# Patient Record
Sex: FEMALE | Race: WHITE | Employment: OTHER | URBAN - METROPOLITAN AREA
[De-identification: names, ages, dates, MRNs, and addresses within clinical notes are randomized per-mention and may not be internally consistent; named-entity substitution may affect disease eponyms.]

---

## 2017-05-18 ENCOUNTER — HOSPITAL ENCOUNTER (OUTPATIENT)
Dept: GENERAL RADIOLOGY | Facility: HOSPITAL | Age: 67
Discharge: HOME | End: 2017-05-18
Attending: INTERNAL MEDICINE
Payer: COMMERCIAL

## 2017-05-18 DIAGNOSIS — R13.10 DYSPHAGIA: ICD-10-CM

## 2017-05-18 PROCEDURE — 74230 X-RAY XM SWLNG FUNCJ C+: CPT | Performed by: INTERNAL MEDICINE

## 2017-05-18 PROCEDURE — 92611 MOTION FLUOROSCOPY/SWALLOW: CPT

## 2017-05-18 NOTE — PROGRESS NOTES
ADULT VIDEOFLUOROSCOPIC SWALLOWING STUDY    Referring Physician: Dr. Ricardo Houston    1.  Dysphagia R13.10 XR VIDEO SWALLOW (CPT=74230)     XR VIDEO SWALLOW (ILA=00205)      Date of Service: 5/18/2017   Radiologist: Dr. Eve Estrada spontaneously with a second swallow. Esophageal phase: An A-P was used to assess upper esophagus functioning. Adequate flow of bolus through upper esophagus was observed.     Overall Impression: Minimal oropharyngeal dysphagia characterized by reduced their apparent satisfaction. INTERDISCIPLINARY COMMUNICATION  Reviewed results with Radiologist; agreement verbalized. Thank you for your referral. If you have any questions, please contact me at Dept: [phone].     Please co-sign or sign and

## 2017-05-23 ENCOUNTER — OFFICE VISIT (OUTPATIENT)
Dept: SPEECH THERAPY | Facility: HOSPITAL | Age: 67
End: 2017-05-23
Attending: INTERNAL MEDICINE
Payer: COMMERCIAL

## 2017-05-23 PROCEDURE — 92526 ORAL FUNCTION THERAPY: CPT

## 2017-05-23 NOTE — PROGRESS NOTES
Diagnosis:  Dysphagia  Authorized # of Visits:  4 Today's visit:  1/4         Next MD visit: none scheduled  Fall Risk: standard         Precautions: n/a           Medication Changes since last visit?: No  Subjective: \"Nothing has changed. \"  Patient repo completed. See above chart for details. Lingual exercises required deep concentration from the patient to complete. Lingual ROM and strength were within normal limits, however, coordination was moderately impaired as was rate.     Patient is asked to comp

## 2017-05-30 ENCOUNTER — OFFICE VISIT (OUTPATIENT)
Dept: SPEECH THERAPY | Facility: HOSPITAL | Age: 67
End: 2017-05-30
Attending: INTERNAL MEDICINE
Payer: COMMERCIAL

## 2017-05-30 PROCEDURE — 92526 ORAL FUNCTION THERAPY: CPT

## 2017-05-30 NOTE — PROGRESS NOTES
Diagnosis:  Dysphagia  Authorized # of Visits:  4 Today's visit:  2/4         Next MD visit: none scheduled  Fall Risk: standard         Precautions: n/a           Medication Changes since last visit?: Yes. Maxx Otero added for dry cough due to blood pressu Her physician suggested this. Explained how the chin tuck opens the vallecular space to provide a \"holding\" space for material if swallow is delayed. Goal progressing.    Goal #3 Patient will reduce risk of aspiration by completing the following dysph

## 2017-06-06 ENCOUNTER — OFFICE VISIT (OUTPATIENT)
Dept: SPEECH THERAPY | Facility: HOSPITAL | Age: 67
End: 2017-06-06
Attending: INTERNAL MEDICINE
Payer: COMMERCIAL

## 2017-06-06 PROCEDURE — 92526 ORAL FUNCTION THERAPY: CPT

## 2017-06-06 NOTE — PROGRESS NOTES
Diagnosis:  Dysphagia  Authorized # of Visits:  4 Today's visit:  3/4         Next MD visit: none scheduled  Fall Risk: standard         Precautions: n/a           Medication Changes since last visit?: Yes. Ana Vela added for dry cough due to blood pressu progressing. Goal #2 The patient/family/caregiver will demonstrate understanding and implementation of aspiration precautions and swallow strategies independently over 2 session(s). Reviewed precautions.     Patient follows all precautions of small sip

## 2017-06-13 ENCOUNTER — OFFICE VISIT (OUTPATIENT)
Dept: SPEECH THERAPY | Facility: HOSPITAL | Age: 67
End: 2017-06-13
Attending: INTERNAL MEDICINE
Payer: COMMERCIAL

## 2017-06-13 PROCEDURE — 92526 ORAL FUNCTION THERAPY: CPT

## 2017-06-13 NOTE — PROGRESS NOTES
Discharge Summary/Final Progress Note    Diagnosis:  Dysphagia  Authorized # of Visits:  4 Today's visit:  4/4         Next MD visit: none scheduled  Fall Risk: standard         Precautions: n/a           Medication Changes since last visit?: no  Jenny Taylor cohesive bolus. No food or liquid presented today as patient still feels slightly sick from food poisoning two days ago. She reports no coughing during PO intake at home. Goal met.    Goal #2 The patient/family/caregiver will demonstrate understanding an

## 2017-06-26 ENCOUNTER — HOSPITAL ENCOUNTER (OUTPATIENT)
Dept: GENERAL RADIOLOGY | Facility: HOSPITAL | Age: 67
Discharge: HOME | End: 2017-06-26
Attending: INTERNAL MEDICINE
Payer: COMMERCIAL

## 2017-06-26 DIAGNOSIS — R05.9 COUGH: ICD-10-CM

## 2017-06-26 PROCEDURE — 71020 XR CHEST PA + LAT CHEST (CPT=71020): CPT | Performed by: INTERNAL MEDICINE

## 2017-09-28 ENCOUNTER — LAB ENCOUNTER (OUTPATIENT)
Dept: LAB | Facility: HOSPITAL | Age: 67
End: 2017-09-28
Attending: INTERNAL MEDICINE
Payer: COMMERCIAL

## 2017-09-28 ENCOUNTER — HOSPITAL ENCOUNTER (OUTPATIENT)
Dept: GENERAL RADIOLOGY | Facility: HOSPITAL | Age: 67
Discharge: HOME | End: 2017-09-28
Attending: INTERNAL MEDICINE
Payer: COMMERCIAL

## 2017-09-28 DIAGNOSIS — E78.00 PURE HYPERCHOLESTEROLEMIA: Primary | ICD-10-CM

## 2017-09-28 DIAGNOSIS — Z01.818 PRE-OP TESTING: ICD-10-CM

## 2017-09-28 PROCEDURE — 71020 XR CHEST PA + LAT CHEST (CPT=71020): CPT | Performed by: INTERNAL MEDICINE

## 2017-09-28 PROCEDURE — 93005 ELECTROCARDIOGRAM TRACING: CPT

## 2017-09-28 PROCEDURE — 93010 ELECTROCARDIOGRAM REPORT: CPT | Performed by: INTERNAL MEDICINE

## 2017-10-04 ENCOUNTER — HOSPITAL ENCOUNTER (OUTPATIENT)
Dept: NUCLEAR MEDICINE | Facility: HOSPITAL | Age: 67
Discharge: HOME | End: 2017-10-04
Attending: INTERNAL MEDICINE
Payer: COMMERCIAL

## 2017-10-04 ENCOUNTER — LAB ENCOUNTER (OUTPATIENT)
Dept: LAB | Facility: HOSPITAL | Age: 67
End: 2017-10-04
Attending: INTERNAL MEDICINE
Payer: COMMERCIAL

## 2017-10-04 ENCOUNTER — HOSPITAL ENCOUNTER (OUTPATIENT)
Dept: CV DIAGNOSTICS | Facility: HOSPITAL | Age: 67
Discharge: HOME | End: 2017-10-04
Attending: INTERNAL MEDICINE
Payer: COMMERCIAL

## 2017-10-04 DIAGNOSIS — R94.31 ABNORMAL EKG: ICD-10-CM

## 2017-10-04 DIAGNOSIS — Z01.818 PRE-OP TESTING: ICD-10-CM

## 2017-10-04 DIAGNOSIS — E78.00 PURE HYPERCHOLESTEROLEMIA: ICD-10-CM

## 2017-10-04 PROCEDURE — 36415 COLL VENOUS BLD VENIPUNCTURE: CPT

## 2017-10-04 PROCEDURE — 87641 MR-STAPH DNA AMP PROBE: CPT

## 2017-10-04 PROCEDURE — 93018 CV STRESS TEST I&R ONLY: CPT | Performed by: INTERNAL MEDICINE

## 2017-10-04 PROCEDURE — 78452 HT MUSCLE IMAGE SPECT MULT: CPT | Performed by: INTERNAL MEDICINE

## 2017-10-04 PROCEDURE — 86900 BLOOD TYPING SEROLOGIC ABO: CPT

## 2017-10-04 PROCEDURE — 93016 CV STRESS TEST SUPVJ ONLY: CPT | Performed by: INTERNAL MEDICINE

## 2017-10-04 PROCEDURE — 85025 COMPLETE CBC W/AUTO DIFF WBC: CPT

## 2017-10-04 PROCEDURE — 93017 CV STRESS TEST TRACING ONLY: CPT | Performed by: INTERNAL MEDICINE

## 2017-10-04 PROCEDURE — 85730 THROMBOPLASTIN TIME PARTIAL: CPT

## 2017-10-04 PROCEDURE — 85610 PROTHROMBIN TIME: CPT

## 2017-10-04 PROCEDURE — 86850 RBC ANTIBODY SCREEN: CPT

## 2017-10-04 PROCEDURE — 84443 ASSAY THYROID STIM HORMONE: CPT

## 2017-10-04 PROCEDURE — 86901 BLOOD TYPING SEROLOGIC RH(D): CPT

## 2017-10-04 PROCEDURE — 80053 COMPREHEN METABOLIC PANEL: CPT

## 2017-10-04 PROCEDURE — 87086 URINE CULTURE/COLONY COUNT: CPT

## 2017-10-04 RX ORDER — AMLODIPINE BESYLATE 5 MG/1
5 TABLET ORAL DAILY
COMMUNITY

## 2017-10-04 RX ORDER — BENZONATATE 100 MG/1
100 CAPSULE ORAL 3 TIMES DAILY PRN
COMMUNITY

## 2017-10-04 RX ORDER — 0.9 % SODIUM CHLORIDE 0.9 %
VIAL (ML) INJECTION
Status: COMPLETED
Start: 2017-10-04 | End: 2017-10-04

## 2017-10-04 RX ADMIN — 0.9 % SODIUM CHLORIDE 10 ML: 0.9 % VIAL (ML) INJECTION at 09:16:00

## 2017-10-16 RX ORDER — SODIUM CHLORIDE 9 MG/ML
INJECTION, SOLUTION INTRAVENOUS ONCE
Status: COMPLETED | OUTPATIENT
Start: 2017-10-17 | End: 2017-10-17

## 2017-10-17 ENCOUNTER — HOSPITAL ENCOUNTER (OUTPATIENT)
Dept: INTERVENTIONAL RADIOLOGY/VASCULAR | Facility: HOSPITAL | Age: 67
Discharge: HOME | End: 2017-10-17
Attending: INTERNAL MEDICINE | Admitting: INTERNAL MEDICINE
Payer: COMMERCIAL

## 2017-10-17 VITALS
BODY MASS INDEX: 35.36 KG/M2 | HEIGHT: 66 IN | RESPIRATION RATE: 16 BRPM | OXYGEN SATURATION: 96 % | WEIGHT: 220 LBS | DIASTOLIC BLOOD PRESSURE: 79 MMHG | HEART RATE: 66 BPM | SYSTOLIC BLOOD PRESSURE: 127 MMHG

## 2017-10-17 DIAGNOSIS — R94.39 ABNORMAL STRESS TEST: ICD-10-CM

## 2017-10-17 PROCEDURE — B2111ZZ FLUOROSCOPY OF MULTIPLE CORONARY ARTERIES USING LOW OSMOLAR CONTRAST: ICD-10-PCS | Performed by: INTERNAL MEDICINE

## 2017-10-17 PROCEDURE — B2151ZZ FLUOROSCOPY OF LEFT HEART USING LOW OSMOLAR CONTRAST: ICD-10-PCS | Performed by: INTERNAL MEDICINE

## 2017-10-17 PROCEDURE — 99153 MOD SED SAME PHYS/QHP EA: CPT

## 2017-10-17 PROCEDURE — 99152 MOD SED SAME PHYS/QHP 5/>YRS: CPT

## 2017-10-17 PROCEDURE — 4A023N7 MEASUREMENT OF CARDIAC SAMPLING AND PRESSURE, LEFT HEART, PERCUTANEOUS APPROACH: ICD-10-PCS | Performed by: INTERNAL MEDICINE

## 2017-10-17 PROCEDURE — 93458 L HRT ARTERY/VENTRICLE ANGIO: CPT

## 2017-10-17 RX ORDER — SODIUM CHLORIDE 9 MG/ML
INJECTION, SOLUTION INTRAVENOUS
Status: COMPLETED
Start: 2017-10-17 | End: 2017-10-17

## 2017-10-17 RX ORDER — ATORVASTATIN CALCIUM 40 MG/1
40 TABLET, FILM COATED ORAL NIGHTLY
Status: DISCONTINUED | OUTPATIENT
Start: 2017-10-17 | End: 2017-10-17

## 2017-10-17 RX ORDER — TRAMADOL HYDROCHLORIDE 50 MG/1
50 TABLET ORAL EVERY 6 HOURS PRN
Status: DISCONTINUED | OUTPATIENT
Start: 2017-10-17 | End: 2017-10-17

## 2017-10-17 RX ORDER — LIDOCAINE HYDROCHLORIDE 20 MG/ML
INJECTION, SOLUTION EPIDURAL; INFILTRATION; INTRACAUDAL; PERINEURAL
Status: COMPLETED
Start: 2017-10-17 | End: 2017-10-17

## 2017-10-17 RX ORDER — NITROGLYCERIN 20 MG/100ML
INJECTION INTRAVENOUS
Status: COMPLETED
Start: 2017-10-17 | End: 2017-10-17

## 2017-10-17 RX ORDER — ATORVASTATIN CALCIUM 40 MG/1
40 TABLET, FILM COATED ORAL NIGHTLY
Qty: 30 TABLET | Refills: 0 | Status: SHIPPED | OUTPATIENT
Start: 2017-10-17

## 2017-10-17 RX ORDER — MIDAZOLAM HYDROCHLORIDE 1 MG/ML
INJECTION INTRAMUSCULAR; INTRAVENOUS
Status: COMPLETED
Start: 2017-10-17 | End: 2017-10-17

## 2017-10-17 RX ORDER — VERAPAMIL HYDROCHLORIDE 2.5 MG/ML
INJECTION, SOLUTION INTRAVENOUS
Status: COMPLETED
Start: 2017-10-17 | End: 2017-10-17

## 2017-10-17 RX ORDER — TRAMADOL HYDROCHLORIDE 50 MG/1
100 TABLET ORAL EVERY 6 HOURS PRN
Status: DISCONTINUED | OUTPATIENT
Start: 2017-10-17 | End: 2017-10-17

## 2017-10-17 RX ORDER — HEPARIN SODIUM 1000 [USP'U]/ML
INJECTION, SOLUTION INTRAVENOUS; SUBCUTANEOUS
Status: COMPLETED
Start: 2017-10-17 | End: 2017-10-17

## 2017-10-17 RX ORDER — SODIUM CHLORIDE 9 MG/ML
INJECTION, SOLUTION INTRAVENOUS CONTINUOUS
Status: DISCONTINUED | OUTPATIENT
Start: 2017-10-17 | End: 2017-10-17

## 2017-10-17 RX ORDER — ACETAMINOPHEN 325 MG/1
650 TABLET ORAL EVERY 4 HOURS PRN
Status: DISCONTINUED | OUTPATIENT
Start: 2017-10-17 | End: 2017-10-17

## 2017-10-17 RX ADMIN — SODIUM CHLORIDE: 9 INJECTION, SOLUTION INTRAVENOUS at 09:01:00

## 2017-10-17 RX ADMIN — SODIUM CHLORIDE: 9 INJECTION, SOLUTION INTRAVENOUS at 07:13:00

## 2017-10-17 NOTE — OPERATIVE REPORT
Preop diagnosis: abnormal stress test preop  Post op diagnosis: same  Procedures: [facility] lv cors  Findings: After informed consent was obtained patient was placed in the cardiac catheterization lab and right wrist were sterilely draped and prepped after a Bar aspirin therapy. Patient may proceed with surgery as planned.   EBL: 20 mls  Specimens: None

## 2017-11-24 ENCOUNTER — LAB ENCOUNTER (OUTPATIENT)
Dept: LAB | Facility: HOSPITAL | Age: 67
End: 2017-11-24
Attending: ORTHOPAEDIC SURGERY
Payer: COMMERCIAL

## 2017-11-24 DIAGNOSIS — Z01.818 PREOP TESTING: ICD-10-CM

## 2017-11-24 PROCEDURE — 87641 MR-STAPH DNA AMP PROBE: CPT

## 2017-11-24 PROCEDURE — 36415 COLL VENOUS BLD VENIPUNCTURE: CPT

## 2017-11-24 PROCEDURE — 86850 RBC ANTIBODY SCREEN: CPT

## 2017-11-24 PROCEDURE — 86901 BLOOD TYPING SEROLOGIC RH(D): CPT

## 2017-11-24 PROCEDURE — 86900 BLOOD TYPING SEROLOGIC ABO: CPT

## 2017-11-29 ENCOUNTER — ANESTHESIA (OUTPATIENT)
Dept: SURGERY | Facility: HOSPITAL | Age: 67
DRG: 470 | End: 2017-11-29
Payer: COMMERCIAL

## 2017-11-29 ENCOUNTER — SURGERY (OUTPATIENT)
Age: 67
End: 2017-11-29

## 2017-11-29 ENCOUNTER — APPOINTMENT (OUTPATIENT)
Dept: GENERAL RADIOLOGY | Facility: HOSPITAL | Age: 67
DRG: 470 | End: 2017-11-29
Attending: NURSE PRACTITIONER
Payer: COMMERCIAL

## 2017-11-29 ENCOUNTER — ANESTHESIA EVENT (OUTPATIENT)
Dept: SURGERY | Facility: HOSPITAL | Age: 67
DRG: 470 | End: 2017-11-29
Payer: COMMERCIAL

## 2017-11-29 ENCOUNTER — HOSPITAL ENCOUNTER (INPATIENT)
Facility: HOSPITAL | Age: 67
LOS: 4 days | Discharge: HOME HEALTH | DRG: 470 | End: 2017-12-03
Attending: ORTHOPAEDIC SURGERY | Admitting: ORTHOPAEDIC SURGERY
Payer: COMMERCIAL

## 2017-11-29 DIAGNOSIS — Z01.818 PREOP TESTING: Primary | ICD-10-CM

## 2017-11-29 DIAGNOSIS — M17.12 PRIMARY OSTEOARTHRITIS OF LEFT KNEE: ICD-10-CM

## 2017-11-29 PROCEDURE — 97161 PT EVAL LOW COMPLEX 20 MIN: CPT

## 2017-11-29 PROCEDURE — 0SRD0J9 REPLACEMENT OF LEFT KNEE JOINT WITH SYNTHETIC SUBSTITUTE, CEMENTED, OPEN APPROACH: ICD-10-PCS | Performed by: ORTHOPAEDIC SURGERY

## 2017-11-29 PROCEDURE — 88311 DECALCIFY TISSUE: CPT | Performed by: ORTHOPAEDIC SURGERY

## 2017-11-29 PROCEDURE — 97116 GAIT TRAINING THERAPY: CPT

## 2017-11-29 PROCEDURE — 73560 X-RAY EXAM OF KNEE 1 OR 2: CPT | Performed by: NURSE PRACTITIONER

## 2017-11-29 PROCEDURE — 88305 TISSUE EXAM BY PATHOLOGIST: CPT | Performed by: ORTHOPAEDIC SURGERY

## 2017-11-29 DEVICE — ART SURF CRFLEX CH/3 4YEL 10MM: Type: IMPLANTABLE DEVICE | Site: KNEE | Status: FUNCTIONAL

## 2017-11-29 DEVICE — NEX PRECT STEM TIB PLT SZ 4: Type: IMPLANTABLE DEVICE | Site: KNEE | Status: FUNCTIONAL

## 2017-11-29 DEVICE — NEX ALL-POLY PAT 32MM: Type: IMPLANTABLE DEVICE | Site: KNEE | Status: FUNCTIONAL

## 2017-11-29 DEVICE — IMPLANTABLE DEVICE: Type: IMPLANTABLE DEVICE | Site: KNEE | Status: FUNCTIONAL

## 2017-11-29 DEVICE — CEMENT BONE ZIM PALICOS R: Type: IMPLANTABLE DEVICE | Site: KNEE | Status: FUNCTIONAL

## 2017-11-29 RX ORDER — HYDROCODONE BITARTRATE AND ACETAMINOPHEN 5; 325 MG/1; MG/1
2 TABLET ORAL AS NEEDED
Status: DISCONTINUED | OUTPATIENT
Start: 2017-11-29 | End: 2017-11-29 | Stop reason: HOSPADM

## 2017-11-29 RX ORDER — OXYCODONE HYDROCHLORIDE AND ACETAMINOPHEN 5; 325 MG/1; MG/1
1 TABLET ORAL EVERY 4 HOURS PRN
Status: DISCONTINUED | OUTPATIENT
Start: 2017-11-29 | End: 2017-11-30

## 2017-11-29 RX ORDER — OXYCODONE AND ACETAMINOPHEN 10; 325 MG/1; MG/1
1 TABLET ORAL EVERY 4 HOURS PRN
Status: DISCONTINUED | OUTPATIENT
Start: 2017-11-29 | End: 2017-12-03

## 2017-11-29 RX ORDER — SODIUM CHLORIDE, SODIUM LACTATE, POTASSIUM CHLORIDE, CALCIUM CHLORIDE 600; 310; 30; 20 MG/100ML; MG/100ML; MG/100ML; MG/100ML
INJECTION, SOLUTION INTRAVENOUS CONTINUOUS
Status: DISCONTINUED | OUTPATIENT
Start: 2017-11-29 | End: 2017-12-02

## 2017-11-29 RX ORDER — METOCLOPRAMIDE 10 MG/1
10 TABLET ORAL ONCE
Status: DISCONTINUED | OUTPATIENT
Start: 2017-11-29 | End: 2017-11-29 | Stop reason: HOSPADM

## 2017-11-29 RX ORDER — ONDANSETRON 2 MG/ML
4 INJECTION INTRAMUSCULAR; INTRAVENOUS ONCE AS NEEDED
Status: DISCONTINUED | OUTPATIENT
Start: 2017-11-29 | End: 2017-11-29 | Stop reason: HOSPADM

## 2017-11-29 RX ORDER — SODIUM CHLORIDE, SODIUM LACTATE, POTASSIUM CHLORIDE, CALCIUM CHLORIDE 600; 310; 30; 20 MG/100ML; MG/100ML; MG/100ML; MG/100ML
INJECTION, SOLUTION INTRAVENOUS CONTINUOUS
Status: DISCONTINUED | OUTPATIENT
Start: 2017-11-29 | End: 2017-11-29 | Stop reason: HOSPADM

## 2017-11-29 RX ORDER — HYDROCODONE BITARTRATE AND ACETAMINOPHEN 5; 325 MG/1; MG/1
1 TABLET ORAL AS NEEDED
Status: DISCONTINUED | OUTPATIENT
Start: 2017-11-29 | End: 2017-11-29 | Stop reason: HOSPADM

## 2017-11-29 RX ORDER — DOXEPIN HYDROCHLORIDE 50 MG/1
1 CAPSULE ORAL DAILY
Status: DISCONTINUED | OUTPATIENT
Start: 2017-11-29 | End: 2017-12-03

## 2017-11-29 RX ORDER — CELECOXIB 200 MG/1
200 CAPSULE ORAL ONCE
Status: COMPLETED | OUTPATIENT
Start: 2017-11-29 | End: 2017-11-29

## 2017-11-29 RX ORDER — NALOXONE HYDROCHLORIDE 0.4 MG/ML
80 INJECTION, SOLUTION INTRAMUSCULAR; INTRAVENOUS; SUBCUTANEOUS AS NEEDED
Status: DISCONTINUED | OUTPATIENT
Start: 2017-11-29 | End: 2017-11-29 | Stop reason: HOSPADM

## 2017-11-29 RX ORDER — SODIUM CHLORIDE 0.9 % (FLUSH) 0.9 %
10 SYRINGE (ML) INJECTION AS NEEDED
Status: DISCONTINUED | OUTPATIENT
Start: 2017-11-29 | End: 2017-12-03

## 2017-11-29 RX ORDER — PANTOPRAZOLE SODIUM 20 MG/1
20 TABLET, DELAYED RELEASE ORAL
Status: DISCONTINUED | OUTPATIENT
Start: 2017-11-30 | End: 2017-12-03

## 2017-11-29 RX ORDER — OXYCODONE HCL 10 MG/1
10 TABLET, FILM COATED, EXTENDED RELEASE ORAL EVERY 12 HOURS
Status: DISCONTINUED | OUTPATIENT
Start: 2017-11-29 | End: 2017-12-03

## 2017-11-29 RX ORDER — ONDANSETRON 2 MG/ML
4 INJECTION INTRAMUSCULAR; INTRAVENOUS EVERY 4 HOURS PRN
Status: DISCONTINUED | OUTPATIENT
Start: 2017-11-29 | End: 2017-12-03

## 2017-11-29 RX ORDER — KETOROLAC TROMETHAMINE 15 MG/ML
15 INJECTION, SOLUTION INTRAMUSCULAR; INTRAVENOUS ONCE
Status: COMPLETED | OUTPATIENT
Start: 2017-11-29 | End: 2017-11-29

## 2017-11-29 RX ORDER — DOCUSATE SODIUM 100 MG/1
100 CAPSULE, LIQUID FILLED ORAL 2 TIMES DAILY
Status: DISCONTINUED | OUTPATIENT
Start: 2017-11-29 | End: 2017-12-03

## 2017-11-29 RX ORDER — SODIUM CHLORIDE 9 MG/ML
INJECTION, SOLUTION INTRAVENOUS CONTINUOUS
Status: DISCONTINUED | OUTPATIENT
Start: 2017-11-29 | End: 2017-12-01

## 2017-11-29 RX ORDER — METOCLOPRAMIDE HYDROCHLORIDE 5 MG/ML
10 INJECTION INTRAMUSCULAR; INTRAVENOUS EVERY 6 HOURS PRN
Status: ACTIVE | OUTPATIENT
Start: 2017-11-29 | End: 2017-12-01

## 2017-11-29 RX ORDER — BENZONATATE 100 MG/1
100 CAPSULE ORAL 3 TIMES DAILY PRN
Status: DISCONTINUED | OUTPATIENT
Start: 2017-11-29 | End: 2017-12-03

## 2017-11-29 RX ORDER — OXYCODONE AND ACETAMINOPHEN 10; 325 MG/1; MG/1
2 TABLET ORAL EVERY 4 HOURS PRN
Status: DISCONTINUED | OUTPATIENT
Start: 2017-11-29 | End: 2017-11-30

## 2017-11-29 RX ORDER — MORPHINE SULFATE 10 MG/ML
6 INJECTION, SOLUTION INTRAMUSCULAR; INTRAVENOUS EVERY 10 MIN PRN
Status: DISCONTINUED | OUTPATIENT
Start: 2017-11-29 | End: 2017-11-29 | Stop reason: HOSPADM

## 2017-11-29 RX ORDER — SCOLOPAMINE TRANSDERMAL SYSTEM 1 MG/1
1 PATCH, EXTENDED RELEASE TRANSDERMAL ONCE
Status: COMPLETED | OUTPATIENT
Start: 2017-11-29 | End: 2017-12-02

## 2017-11-29 RX ORDER — HYDROCODONE BITARTRATE AND ACETAMINOPHEN 7.5; 325 MG/1; MG/1
1 TABLET ORAL EVERY 6 HOURS PRN
Status: DISCONTINUED | OUTPATIENT
Start: 2017-11-29 | End: 2017-12-03

## 2017-11-29 RX ORDER — FAMOTIDINE 20 MG/1
20 TABLET ORAL ONCE
Status: DISCONTINUED | OUTPATIENT
Start: 2017-11-29 | End: 2017-11-29 | Stop reason: HOSPADM

## 2017-11-29 RX ORDER — HYDROMORPHONE HYDROCHLORIDE 1 MG/ML
0.2 INJECTION, SOLUTION INTRAMUSCULAR; INTRAVENOUS; SUBCUTANEOUS EVERY 5 MIN PRN
Status: DISCONTINUED | OUTPATIENT
Start: 2017-11-29 | End: 2017-11-29 | Stop reason: HOSPADM

## 2017-11-29 RX ORDER — HYDROMORPHONE HYDROCHLORIDE 1 MG/ML
0.2 INJECTION, SOLUTION INTRAMUSCULAR; INTRAVENOUS; SUBCUTANEOUS EVERY 2 HOUR PRN
Status: DISCONTINUED | OUTPATIENT
Start: 2017-11-29 | End: 2017-12-03

## 2017-11-29 RX ORDER — SODIUM PHOSPHATE, DIBASIC AND SODIUM PHOSPHATE, MONOBASIC 7; 19 G/133ML; G/133ML
1 ENEMA RECTAL ONCE AS NEEDED
Status: DISCONTINUED | OUTPATIENT
Start: 2017-11-29 | End: 2017-12-03

## 2017-11-29 RX ORDER — MIDAZOLAM HYDROCHLORIDE 1 MG/ML
INJECTION INTRAMUSCULAR; INTRAVENOUS AS NEEDED
Status: DISCONTINUED | OUTPATIENT
Start: 2017-11-29 | End: 2017-11-29 | Stop reason: SURG

## 2017-11-29 RX ORDER — OXYCODONE HCL 10 MG/1
10 TABLET, FILM COATED, EXTENDED RELEASE ORAL ONCE
Status: COMPLETED | OUTPATIENT
Start: 2017-11-29 | End: 2017-11-29

## 2017-11-29 RX ORDER — ACETAMINOPHEN 325 MG/1
650 TABLET ORAL EVERY 6 HOURS PRN
Status: DISCONTINUED | OUTPATIENT
Start: 2017-11-29 | End: 2017-12-03

## 2017-11-29 RX ORDER — POLYETHYLENE GLYCOL 3350 17 G/17G
17 POWDER, FOR SOLUTION ORAL DAILY PRN
Status: DISCONTINUED | OUTPATIENT
Start: 2017-11-29 | End: 2017-12-03

## 2017-11-29 RX ORDER — DIPHENHYDRAMINE HYDROCHLORIDE 50 MG/ML
12.5 INJECTION INTRAMUSCULAR; INTRAVENOUS EVERY 4 HOURS PRN
Status: DISCONTINUED | OUTPATIENT
Start: 2017-11-29 | End: 2017-12-03

## 2017-11-29 RX ORDER — BUPIVACAINE HYDROCHLORIDE 2.5 MG/ML
INJECTION, SOLUTION EPIDURAL; INFILTRATION; INTRACAUDAL AS NEEDED
Status: DISCONTINUED | OUTPATIENT
Start: 2017-11-29 | End: 2017-11-29 | Stop reason: HOSPADM

## 2017-11-29 RX ORDER — CELECOXIB 200 MG/1
200 CAPSULE ORAL EVERY 12 HOURS
Status: DISCONTINUED | OUTPATIENT
Start: 2017-11-30 | End: 2017-12-03

## 2017-11-29 RX ORDER — HYDROCODONE BITARTRATE AND ACETAMINOPHEN 7.5; 325 MG/1; MG/1
2 TABLET ORAL EVERY 6 HOURS PRN
Status: DISCONTINUED | OUTPATIENT
Start: 2017-11-29 | End: 2017-12-03

## 2017-11-29 RX ORDER — MORPHINE SULFATE 4 MG/ML
4 INJECTION, SOLUTION INTRAMUSCULAR; INTRAVENOUS EVERY 10 MIN PRN
Status: DISCONTINUED | OUTPATIENT
Start: 2017-11-29 | End: 2017-11-29 | Stop reason: HOSPADM

## 2017-11-29 RX ORDER — HYDROMORPHONE HYDROCHLORIDE 1 MG/ML
0.6 INJECTION, SOLUTION INTRAMUSCULAR; INTRAVENOUS; SUBCUTANEOUS
Status: ACTIVE | OUTPATIENT
Start: 2017-11-29 | End: 2017-11-30

## 2017-11-29 RX ORDER — HALOPERIDOL 5 MG/ML
0.25 INJECTION INTRAMUSCULAR ONCE AS NEEDED
Status: DISCONTINUED | OUTPATIENT
Start: 2017-11-29 | End: 2017-11-29 | Stop reason: HOSPADM

## 2017-11-29 RX ORDER — DIPHENHYDRAMINE HCL 25 MG
25 CAPSULE ORAL EVERY 4 HOURS PRN
Status: DISPENSED | OUTPATIENT
Start: 2017-11-29 | End: 2017-11-30

## 2017-11-29 RX ORDER — FAMOTIDINE 10 MG/ML
20 INJECTION, SOLUTION INTRAVENOUS 2 TIMES DAILY
Status: DISCONTINUED | OUTPATIENT
Start: 2017-11-29 | End: 2017-12-01

## 2017-11-29 RX ORDER — DIPHENHYDRAMINE HYDROCHLORIDE 50 MG/ML
12.5 INJECTION INTRAMUSCULAR; INTRAVENOUS EVERY 4 HOURS PRN
Status: ACTIVE | OUTPATIENT
Start: 2017-11-29 | End: 2017-11-30

## 2017-11-29 RX ORDER — AMLODIPINE BESYLATE 5 MG/1
5 TABLET ORAL DAILY
Status: DISCONTINUED | OUTPATIENT
Start: 2017-11-30 | End: 2017-12-03

## 2017-11-29 RX ORDER — ACETAMINOPHEN 500 MG
1000 TABLET ORAL ONCE
Status: COMPLETED | OUTPATIENT
Start: 2017-11-29 | End: 2017-11-29

## 2017-11-29 RX ORDER — BUPIVACAINE HYDROCHLORIDE 7.5 MG/ML
INJECTION, SOLUTION INTRASPINAL AS NEEDED
Status: DISCONTINUED | OUTPATIENT
Start: 2017-11-29 | End: 2017-11-29 | Stop reason: SURG

## 2017-11-29 RX ORDER — ONDANSETRON 2 MG/ML
INJECTION INTRAMUSCULAR; INTRAVENOUS AS NEEDED
Status: DISCONTINUED | OUTPATIENT
Start: 2017-11-29 | End: 2017-11-29 | Stop reason: SURG

## 2017-11-29 RX ORDER — HETASTARCH 6 G/100ML
500 INJECTION, SOLUTION INTRAVENOUS ONCE
Status: COMPLETED | OUTPATIENT
Start: 2017-11-29 | End: 2017-11-29

## 2017-11-29 RX ORDER — HYDROMORPHONE HYDROCHLORIDE 1 MG/ML
0.8 INJECTION, SOLUTION INTRAMUSCULAR; INTRAVENOUS; SUBCUTANEOUS EVERY 2 HOUR PRN
Status: DISCONTINUED | OUTPATIENT
Start: 2017-11-29 | End: 2017-12-03

## 2017-11-29 RX ORDER — ONDANSETRON 2 MG/ML
4 INJECTION INTRAMUSCULAR; INTRAVENOUS ONCE AS NEEDED
Status: COMPLETED | OUTPATIENT
Start: 2017-11-29 | End: 2017-11-29

## 2017-11-29 RX ORDER — NALOXONE HYDROCHLORIDE 0.4 MG/ML
0.08 INJECTION, SOLUTION INTRAMUSCULAR; INTRAVENOUS; SUBCUTANEOUS
Status: ACTIVE | OUTPATIENT
Start: 2017-11-29 | End: 2017-11-30

## 2017-11-29 RX ORDER — ATORVASTATIN CALCIUM 40 MG/1
40 TABLET, FILM COATED ORAL NIGHTLY
Status: DISCONTINUED | OUTPATIENT
Start: 2017-11-29 | End: 2017-12-03

## 2017-11-29 RX ORDER — HYDROMORPHONE HYDROCHLORIDE 1 MG/ML
0.4 INJECTION, SOLUTION INTRAMUSCULAR; INTRAVENOUS; SUBCUTANEOUS EVERY 5 MIN PRN
Status: DISCONTINUED | OUTPATIENT
Start: 2017-11-29 | End: 2017-11-29 | Stop reason: HOSPADM

## 2017-11-29 RX ORDER — LIDOCAINE HYDROCHLORIDE 10 MG/ML
INJECTION, SOLUTION EPIDURAL; INFILTRATION; INTRACAUDAL; PERINEURAL AS NEEDED
Status: DISCONTINUED | OUTPATIENT
Start: 2017-11-29 | End: 2017-11-29 | Stop reason: SURG

## 2017-11-29 RX ORDER — HYDROMORPHONE HYDROCHLORIDE 1 MG/ML
0.4 INJECTION, SOLUTION INTRAMUSCULAR; INTRAVENOUS; SUBCUTANEOUS
Status: ACTIVE | OUTPATIENT
Start: 2017-11-29 | End: 2017-11-30

## 2017-11-29 RX ORDER — FAMOTIDINE 10 MG
20 TABLET ORAL 2 TIMES DAILY
Status: DISCONTINUED | OUTPATIENT
Start: 2017-11-29 | End: 2017-12-01

## 2017-11-29 RX ORDER — HYDROMORPHONE HYDROCHLORIDE 1 MG/ML
0.6 INJECTION, SOLUTION INTRAMUSCULAR; INTRAVENOUS; SUBCUTANEOUS EVERY 5 MIN PRN
Status: DISCONTINUED | OUTPATIENT
Start: 2017-11-29 | End: 2017-11-29 | Stop reason: HOSPADM

## 2017-11-29 RX ORDER — NALBUPHINE HCL 10 MG/ML
2.5 AMPUL (ML) INJECTION EVERY 4 HOURS PRN
Status: DISCONTINUED | OUTPATIENT
Start: 2017-11-29 | End: 2017-12-03

## 2017-11-29 RX ORDER — DIPHENHYDRAMINE HCL 25 MG
25 CAPSULE ORAL EVERY 4 HOURS PRN
Status: DISCONTINUED | OUTPATIENT
Start: 2017-11-29 | End: 2017-12-03

## 2017-11-29 RX ORDER — MORPHINE SULFATE 1 MG/ML
INJECTION, SOLUTION EPIDURAL; INTRATHECAL; INTRAVENOUS AS NEEDED
Status: DISCONTINUED | OUTPATIENT
Start: 2017-11-29 | End: 2017-11-29 | Stop reason: SURG

## 2017-11-29 RX ORDER — PAROXETINE HYDROCHLORIDE 20 MG/1
20 TABLET, FILM COATED ORAL NIGHTLY
Status: DISCONTINUED | OUTPATIENT
Start: 2017-11-29 | End: 2017-12-03

## 2017-11-29 RX ORDER — MORPHINE SULFATE 2 MG/ML
2 INJECTION, SOLUTION INTRAMUSCULAR; INTRAVENOUS EVERY 10 MIN PRN
Status: DISCONTINUED | OUTPATIENT
Start: 2017-11-29 | End: 2017-11-29 | Stop reason: HOSPADM

## 2017-11-29 RX ORDER — HYDROMORPHONE HYDROCHLORIDE 1 MG/ML
0.4 INJECTION, SOLUTION INTRAMUSCULAR; INTRAVENOUS; SUBCUTANEOUS EVERY 2 HOUR PRN
Status: DISCONTINUED | OUTPATIENT
Start: 2017-11-29 | End: 2017-12-03

## 2017-11-29 RX ORDER — HALOPERIDOL 5 MG/ML
0.5 INJECTION INTRAMUSCULAR ONCE AS NEEDED
Status: ACTIVE | OUTPATIENT
Start: 2017-11-29 | End: 2017-11-29

## 2017-11-29 RX ORDER — DEXAMETHASONE SODIUM PHOSPHATE 4 MG/ML
VIAL (ML) INJECTION AS NEEDED
Status: DISCONTINUED | OUTPATIENT
Start: 2017-11-29 | End: 2017-11-29 | Stop reason: SURG

## 2017-11-29 RX ORDER — BISACODYL 10 MG
10 SUPPOSITORY, RECTAL RECTAL
Status: DISCONTINUED | OUTPATIENT
Start: 2017-11-29 | End: 2017-12-03

## 2017-11-29 RX ORDER — ZOLPIDEM TARTRATE 5 MG/1
5 TABLET ORAL NIGHTLY PRN
Status: DISCONTINUED | OUTPATIENT
Start: 2017-11-29 | End: 2017-12-03

## 2017-11-29 RX ADMIN — MIDAZOLAM HYDROCHLORIDE 2 MG: 1 INJECTION INTRAMUSCULAR; INTRAVENOUS at 08:03:00

## 2017-11-29 RX ADMIN — DEXAMETHASONE SODIUM PHOSPHATE 4 MG: 4 MG/ML VIAL (ML) INJECTION at 08:26:00

## 2017-11-29 RX ADMIN — SODIUM CHLORIDE, SODIUM LACTATE, POTASSIUM CHLORIDE, CALCIUM CHLORIDE: 600; 310; 30; 20 INJECTION, SOLUTION INTRAVENOUS at 08:02:00

## 2017-11-29 RX ADMIN — LIDOCAINE HYDROCHLORIDE 15 MG: 10 INJECTION, SOLUTION EPIDURAL; INFILTRATION; INTRACAUDAL; PERINEURAL at 08:04:00

## 2017-11-29 RX ADMIN — MORPHINE SULFATE 0.25 MG: 1 INJECTION, SOLUTION EPIDURAL; INTRATHECAL; INTRAVENOUS at 08:10:00

## 2017-11-29 RX ADMIN — SODIUM CHLORIDE, SODIUM LACTATE, POTASSIUM CHLORIDE, CALCIUM CHLORIDE: 600; 310; 30; 20 INJECTION, SOLUTION INTRAVENOUS at 10:15:00

## 2017-11-29 RX ADMIN — ONDANSETRON 4 MG: 2 INJECTION INTRAMUSCULAR; INTRAVENOUS at 10:05:00

## 2017-11-29 RX ADMIN — LIDOCAINE HYDROCHLORIDE 25 MG: 10 INJECTION, SOLUTION EPIDURAL; INFILTRATION; INTRACAUDAL; PERINEURAL at 08:15:00

## 2017-11-29 RX ADMIN — BUPIVACAINE HYDROCHLORIDE 1.4 ML: 7.5 INJECTION, SOLUTION INTRASPINAL at 08:10:00

## 2017-11-29 NOTE — CM/SW NOTE
11/29CM- MD orders received in regards to discharge planning. The Patient was seen at bedside. The Patient resides  in [place] in 2nd floor walk up [facility] with 12 stairs to her unit.  .  Prior to hospitalization, the Patient was  driving,  grocery [place]

## 2017-11-29 NOTE — ANESTHESIA POSTPROCEDURE EVALUATION
Patient: Jacobo Landry    Procedure Summary     Date:  11/29/17 Room / Location:  [address] [place] [address] MAIN OR [address] [place] [address] MAIN OR    Anesthesia Start:  0802 Anesthesia Stop:      Procedure:  KNEE TOTAL REPLACEMENT (Left ) Diagnosis:  (degenerative joint diseae)    Surgeon:

## 2017-11-29 NOTE — PROGRESS NOTES
[facility]    Progress Note    Shaun Rubio Patient Status:  Surgery Admit    3/21/1950 MRN [id number]   Location [address] [facility] Attending Murtaza Santos MD   Hosp Day # 0 PCP Afua Bynum MD       Subjective:   Fabian Nunes

## 2017-11-29 NOTE — H&P
History & Physical Examination    Patient Name: Mp Limon  MRN: [id number]  CSN: [id number]  YOB: 1950    Diagnosis: L knee DJD    Present Illness: Mp Limon is a 75year old yo female with a history of L knee end stage DJD.   Cleared ENDOSCOPY,EXAM  Family History   Problem Relation Age of Onset   • Lung Disorder Father      emphysema   • Cancer Mother      breast        Smoking status: Former Smoker  1.00 Packs/day     Smokeless tobacco: Former User    Comment: quit 40 yrs ago    [place]

## 2017-11-29 NOTE — PROGRESS NOTES
Esomeprazole is a non-formulary medication and will be therapeutically interchanged to Pantoprozole per P&T protocol

## 2017-11-29 NOTE — PROGRESS NOTES
11/29/17 161   Clinical Encounter Type   Visited With Patient and family together  (Daughter at bedside)   Routine Visit Introduction   Referral From Nurse   Referral To    Patient Spiritual Encounters   Spiritual Assessment Completed 1   Spiri

## 2017-11-29 NOTE — ANESTHESIA PROCEDURE NOTES
Spinal Block  Performed by: Sharri Gan  Authorized by: Viktoriya Garza     Patient Location:  OR  Start Time:  11/29/2017 8:04 AM  End Time:  11/29/2017 8:10 AM  Site identification: surface landmarks    Reason for Block: at surgeon's reques

## 2017-11-29 NOTE — ANESTHESIA PREPROCEDURE EVALUATION
Anesthesia PreOp Note    HPI:     Sherrod Harada is a 75year old female who presents for preoperative consultation requested by: Linward Moritz, MD    Date of Surgery: 11/29/2017    Procedure(s):  KNEE TOTAL REPLACEMENT  Indication: degenerative joint dis (REGLAN) tab 10 mg 10 mg Oral Once Yaima Soares MD    scopolamine (TRANSDERM-SCOP) 1.5 mg patch 1 patch Transdermal Once Flaquito Waldrop NP 1 patch at 11/29/17 0657   CeFAZolin Sodium (ANCEF/KEFZOL) premix IV syringe 2 g 2 g Intravenous Once Samy Right arm   Pulse:  76   Resp:  16   Temp:  97.9 °F (36.6 °C)   TempSrc:  Oral   SpO2:  94%   Weight: 97.5 kg (215 lb) 101.6 kg (224 lb)   Height: 1.676 m (5' 6\") 1.676 m (5' 6\")        Anesthesia ROS/Med Hx and Physical Exam     Patient summary reviewed

## 2017-11-29 NOTE — PHYSICAL THERAPY NOTE
PHYSICAL THERAPY KNEE EVALUATION - INPATIENT     Room Number: 447/447-A  Evaluation Date: 11/29/2017  Type of Evaluation: Initial  Physician Order: PT Eval and Treat    Presenting Problem: s/p left total knee arthroplasty  Reason for Therapy: Mobility Dysf • Esophageal reflux    • High blood pressure    • High cholesterol    • Osteoarthritis    • PONV (postoperative nausea and vomiting)        Past Surgical History  Past Surgical History:  10/2017: CATH PERCUTANEOUS  TRANSLUMINAL CORONARY ANGIO*  10/2016: cannula  Liters of O2:  2  No shortness of breath    AM-PAC '6-Clicks' INPATIENT SHORT FORM - BASIC MOBILITY  How much difficulty does the patient currently have. ..  -   Turning over in bed (including adjusting bedclothes, sheets and blankets)?: None   - level: modified independent    Goal #3   Current Status    Goal #4 Patient will negotiate 12 stairs/one curb w/ assistive device and supervision   Goal #4   Current Status    Goal #5  AROM 0 degrees extension to 95 degrees flexion     Goal #5   Current Sta

## 2017-11-30 ENCOUNTER — APPOINTMENT (OUTPATIENT)
Dept: CT IMAGING | Facility: HOSPITAL | Age: 67
DRG: 470 | End: 2017-11-30
Attending: INTERNAL MEDICINE
Payer: COMMERCIAL

## 2017-11-30 ENCOUNTER — APPOINTMENT (OUTPATIENT)
Dept: ULTRASOUND IMAGING | Facility: HOSPITAL | Age: 67
DRG: 470 | End: 2017-11-30
Attending: INTERNAL MEDICINE
Payer: COMMERCIAL

## 2017-11-30 ENCOUNTER — APPOINTMENT (OUTPATIENT)
Dept: GENERAL RADIOLOGY | Facility: HOSPITAL | Age: 67
DRG: 470 | End: 2017-11-30
Attending: INTERNAL MEDICINE
Payer: COMMERCIAL

## 2017-11-30 PROCEDURE — 36600 WITHDRAWAL OF ARTERIAL BLOOD: CPT | Performed by: INTERNAL MEDICINE

## 2017-11-30 PROCEDURE — 93970 EXTREMITY STUDY: CPT | Performed by: INTERNAL MEDICINE

## 2017-11-30 PROCEDURE — 85027 COMPLETE CBC AUTOMATED: CPT | Performed by: INTERNAL MEDICINE

## 2017-11-30 PROCEDURE — 82805 BLOOD GASES W/O2 SATURATION: CPT | Performed by: INTERNAL MEDICINE

## 2017-11-30 PROCEDURE — 70460 CT HEAD/BRAIN W/DYE: CPT | Performed by: INTERNAL MEDICINE

## 2017-11-30 PROCEDURE — 85027 COMPLETE CBC AUTOMATED: CPT | Performed by: NURSE PRACTITIONER

## 2017-11-30 PROCEDURE — 80048 BASIC METABOLIC PNL TOTAL CA: CPT | Performed by: INTERNAL MEDICINE

## 2017-11-30 PROCEDURE — 71020 XR CHEST PA + LAT CHEST (CPT=71020): CPT | Performed by: INTERNAL MEDICINE

## 2017-11-30 PROCEDURE — 97110 THERAPEUTIC EXERCISES: CPT

## 2017-11-30 PROCEDURE — 97116 GAIT TRAINING THERAPY: CPT

## 2017-11-30 PROCEDURE — 85730 THROMBOPLASTIN TIME PARTIAL: CPT | Performed by: INTERNAL MEDICINE

## 2017-11-30 PROCEDURE — S0028 INJECTION, FAMOTIDINE, 20 MG: HCPCS | Performed by: NURSE PRACTITIONER

## 2017-11-30 PROCEDURE — 94640 AIRWAY INHALATION TREATMENT: CPT

## 2017-11-30 PROCEDURE — 80048 BASIC METABOLIC PNL TOTAL CA: CPT | Performed by: NURSE PRACTITIONER

## 2017-11-30 PROCEDURE — 71260 CT THORAX DX C+: CPT | Performed by: INTERNAL MEDICINE

## 2017-11-30 RX ORDER — HEPARIN SODIUM AND DEXTROSE 10000; 5 [USP'U]/100ML; G/100ML
INJECTION INTRAVENOUS CONTINUOUS
Status: DISCONTINUED | OUTPATIENT
Start: 2017-12-01 | End: 2017-12-02

## 2017-11-30 RX ORDER — IPRATROPIUM BROMIDE AND ALBUTEROL SULFATE 2.5; .5 MG/3ML; MG/3ML
3 SOLUTION RESPIRATORY (INHALATION)
Status: DISCONTINUED | OUTPATIENT
Start: 2017-11-30 | End: 2017-12-01

## 2017-11-30 RX ORDER — IPRATROPIUM BROMIDE AND ALBUTEROL SULFATE 2.5; .5 MG/3ML; MG/3ML
3 SOLUTION RESPIRATORY (INHALATION) EVERY 4 HOURS PRN
Status: DISCONTINUED | OUTPATIENT
Start: 2017-11-30 | End: 2017-12-03

## 2017-11-30 RX ORDER — HEPARIN SODIUM 1000 [USP'U]/ML
80 INJECTION, SOLUTION INTRAVENOUS; SUBCUTANEOUS ONCE
Status: COMPLETED | OUTPATIENT
Start: 2017-11-30 | End: 2017-11-30

## 2017-11-30 RX ORDER — HEPARIN SODIUM AND DEXTROSE 10000; 5 [USP'U]/100ML; G/100ML
18 INJECTION INTRAVENOUS ONCE
Status: COMPLETED | OUTPATIENT
Start: 2017-11-30 | End: 2017-11-30

## 2017-11-30 NOTE — PROGRESS NOTES
[facility]    Progress Note    Lexie Rodgers Patient Status:  Surgery Admit    3/21/1950 MRN [id number]   Location [address] [facility] Attending Andrea Starr MD   Hosp Day # 1 PCP Daniel Washington MD       Subjective:   Leonarda Britton PARoxetine HCl  20 mg Oral Nightly   • celecoxib  200 mg Oral Q12H   • OxyCODONE HCl ER  10 mg Oral Q12H   • docusate sodium  100 mg Oral BID   • famoTIDine  20 mg Oral BID    Or   • famoTIDine  20 mg Intravenous BID   • rivaroxaban  10 mg Oral Q24H

## 2017-11-30 NOTE — PROGRESS NOTES
Pulmonary addendum:   CT did reveal RLL PE. D/w surgical APN, will start heparin gtt. D/c'd prophylactic lovenox. LE dopplers and echo ordered.

## 2017-11-30 NOTE — CONSULTS
pod1 left knee arhtroplasty with duramorph spinal pt tolerated procedure  Well good post op pain releif no headache no backache   cpm

## 2017-11-30 NOTE — PLAN OF CARE
PAIN - ADULT    • Verbalizes/displays adequate comfort level or patient's stated pain goal Progressing          SAFETY ADULT - FALL    • Free from fall injury Progressing          DISCHARGE PLANNING    • Discharge to home or other facility with appropriate

## 2017-11-30 NOTE — CONSULTS
Pulmonary H&P/Consult     NAME: Loco Alan - ROOM: [address] [place] [address] - MRN: [id number] - Age: 75year old - :  3/21/1950    Date of Admission: 2017  6:10 AM  Admission Diagnosis: degenerative joint diseae  Preop testing    Assessment/Plan:  1.  Hypoxia/ 4.2 oz/week    7 Glasses of wine per week          Medications:  • scopolamine  1 patch Transdermal Once   • AmLODIPine Besylate  5 mg Oral Daily   • atorvastatin  40 mg Oral Nightly   • Pantoprazole Sodium  20 mg Oral QAM AC   • losartan-hydrochlorothiazi for input(s): TROP, CK in the last 72 hours. Imaging: I independently visualized all relevant chest imaging in PACS, agree with radiology interpretation except where noted.

## 2017-11-30 NOTE — PLAN OF CARE
NEUROLOGICAL - ADULT    • Achieves stable or improved neurological status Not Progressing        RESPIRATORY - ADULT    • Achieves optimal ventilation and oxygenation Not Progressing          DISCHARGE PLANNING    • Discharge to home or other facility with

## 2017-11-30 NOTE — PROGRESS NOTES
[facility]    Progress Note    Heriberto Prado Patient Status:  Inpatient    3/21/1950 MRN [id number]   Location [facility] 4W/SW/SE Attending Amira Morales MD   Hosp Day # 1 PCP Caty Betancur MD       Subjective:   Heriberto Prado 25         Xr Knee (1 Or 2 Views), Left (cpt=73560)    Result Date: 11/29/2017  CONCLUSION:  1. Left knee arthroplasty.                  Ji Banda, NP  [facility] at [facility]  [phone] (c)  [phone] (o)  11/30/2017

## 2017-11-30 NOTE — OPERATIVE REPORT
[address]  [address] [place] [address]?[phone]\V OPERATIVE REPORT\[id number] NAME: Tiffany Burger   MR#: [id number]   PMD:     DATE OF PROCEDURE: 29-Nov-2017   PREOPERATIVE DIAGNOSIS: Degenerative Arthritis left knee   P and radiographic findings. We reviewed the risks, benefits and alternatives to the procedure and informed consent was obtained.  The risks discussed included, but were not limited, to infection, nerve injury (peroneal, tibial and skin branches of the saphen lateral menisci, the anterior cruciate ligament (ACL) and a portion of the patellar fat pad.  We completed our medial release by dissecting from the midline around to the posteriomedial corner in a subperiosteal fashion along the tibia and removed bony oste holes at approximately 3 degrees of external rotation into the distal femur based on the posterior condylar axis.  This lined up nicely with Tai's line and we then placed the gold pins in the drill holes and placed the four-in-one cutting block over t cement was fully hardened the patella clamp was removed and the knee was taken though a full range of motion. Excellent range of motion and good stability was noted, throughout the entire arc of motion with the 10mm trial insert in place.  Therefore, the wo by: Susan Lee MD. Reason: I have reviewed and approve this document.  Date: [id number] S: 11/29/2017 8:53:52 PM         DISTRIBUTION LIST:     Susan Lee MD

## 2017-11-30 NOTE — IMAGING NOTE
CT Brain was ordered after CT Chest PE was already performed.  OK to scan brain post IV contrast injection Per Dr. Brooks Mater

## 2017-11-30 NOTE — SIGNIFICANT EVENT
I am working with [address] [place] [address], precept-ing her today, also taking care of Mandeep Knight. I gave patient . 8 of Dilaudid at 0730 am because patient stated her pain was an 8 out 10. Patient required 1L of O2 at this time because O2 sat was 88% on room air.  Patient's p

## 2017-11-30 NOTE — PROGRESS NOTES
Patient was resting in chair around 1430. When moving from chair to bed patient became short of breath and had audible expiratory wheezes. Patient was on 3L of oxygen via nasal cannula; patient does not use home oxygen.  When patient was working with PT ear

## 2017-12-01 ENCOUNTER — APPOINTMENT (OUTPATIENT)
Dept: CV DIAGNOSTICS | Facility: HOSPITAL | Age: 67
DRG: 470 | End: 2017-12-01
Attending: INTERNAL MEDICINE
Payer: COMMERCIAL

## 2017-12-01 PROCEDURE — 94640 AIRWAY INHALATION TREATMENT: CPT

## 2017-12-01 PROCEDURE — 97110 THERAPEUTIC EXERCISES: CPT

## 2017-12-01 PROCEDURE — 97530 THERAPEUTIC ACTIVITIES: CPT

## 2017-12-01 PROCEDURE — 97535 SELF CARE MNGMENT TRAINING: CPT

## 2017-12-01 PROCEDURE — 93306 TTE W/DOPPLER COMPLETE: CPT | Performed by: INTERNAL MEDICINE

## 2017-12-01 PROCEDURE — 97165 OT EVAL LOW COMPLEX 30 MIN: CPT

## 2017-12-01 PROCEDURE — 97116 GAIT TRAINING THERAPY: CPT

## 2017-12-01 PROCEDURE — 85730 THROMBOPLASTIN TIME PARTIAL: CPT | Performed by: INTERNAL MEDICINE

## 2017-12-01 PROCEDURE — 85027 COMPLETE CBC AUTOMATED: CPT | Performed by: NURSE PRACTITIONER

## 2017-12-01 RX ORDER — IPRATROPIUM BROMIDE AND ALBUTEROL SULFATE 2.5; .5 MG/3ML; MG/3ML
3 SOLUTION RESPIRATORY (INHALATION)
Status: DISCONTINUED | OUTPATIENT
Start: 2017-12-01 | End: 2017-12-03

## 2017-12-01 RX ORDER — HEPARIN SODIUM 1000 [USP'U]/ML
30 INJECTION, SOLUTION INTRAVENOUS; SUBCUTANEOUS ONCE
Status: COMPLETED | OUTPATIENT
Start: 2017-12-01 | End: 2017-12-01

## 2017-12-01 NOTE — PROGRESS NOTES
[facility]    Progress Note    Lord Pedro Patient Status:  Inpatient    3/21/1950 MRN [id number]   Location [facility] 4W/SW/SE Attending Yaima Soares MD   Hosp Day # 2 PCP Afua Guerrier MD       Subjective:   Lord Pedro INR 1.0 10/04/2017   TSH 2.95 10/04/2017       Xr Chest Pa + Lat Chest (xns=[id number])    Result Date: 11/30/2017  CONCLUSION:  1. The right hilum is prominent and is more conspicuous than seen previously.  Recommend dedicated CT of the chest with contrast to Awaiting callback.               Randee Diallo MD  [facility] at [facility]  [phone] (c)  [phone] (o)  12/1/2017

## 2017-12-01 NOTE — CM/SW NOTE
MD order received regarding Xarelto coverage. RIGO met with the pt. Who is agreeable to contacting her insurance/pharmacy regarding her prescription coverage. RIGO also provided the pt. With a Xarelto coupon. The pt. Is aware and agreeable. The pt.  Sudheer

## 2017-12-01 NOTE — PLAN OF CARE
RESPIRATORY - ADULT    • Achieves optimal ventilation and oxygenation Not Progressing          DISCHARGE PLANNING    • Discharge to home or other facility with appropriate resources Progressing        GENITOURINARY - ADULT    • Absence of urinary retention

## 2017-12-01 NOTE — PROGRESS NOTES
[facility]    Progress Note    Lola Melinda Patient Status:  Surgery Admit    3/21/1950 MRN [id number]   Location [address] [facility] Attending Ola Boeck, MD   Hosp Day # 2 PCP Lucero Lindsay MD       Subjective:   Trenton Moy hours. No results for input(s): PT, INR in the last 72 hours. No results for input(s): PGLU in the last 72 hours.     Scheduled Meds:   • ipratropium-albuterol  3 mL Nebulization 4 times per day   • scopolamine  1 patch Transdermal Once   • AmLODIPine Ct Chest Pain/pe (iv Only) Em    Result Date: 11/30/2017  CONCLUSION:  1. Acute pulmonary embolism involving proximal segmental branches of the right lower lobe.  2. Dilatation of the main pulmonary artery trunk may relate to underlying pulmonary hype

## 2017-12-01 NOTE — PLAN OF CARE
DISCHARGE PLANNING    • Discharge to home or other facility with appropriate resources Progressing        GENITOURINARY - ADULT    • Absence of urinary retention Progressing        NEUROLOGICAL - ADULT    • Achieves stable or improved neurological status P

## 2017-12-01 NOTE — PROGRESS NOTES
Pharmacy note: Duplicate Proton Pump Inhibitor with Histamine 2 blocker. Lexie Rodgers is a 75year old female who has been prescribed both Famotidine and Protonix.   Pepcid was discontinued per P&T approved protocol for duplicate therapy in adult patien

## 2017-12-01 NOTE — PROGRESS NOTES
Pulmonary Progress Note     Assessment / Plan:  1. Hypoxia/dyspnea - due to PE  - wean O2 as able  - ac as below  2. Pulmonary embolism - provoked.  LE duplex negative  - heparin gtt  - anticipate transitioning to Xarelto tomorrow  - SW consult for Xarelto

## 2017-12-01 NOTE — OCCUPATIONAL THERAPY NOTE
OCCUPATIONAL THERAPY EVALUATION - INPATIENT      Room Number: 447/447-A  Evaluation Date: 12/1/2017  Type of Evaluation: Initial  Presenting Problem:  (Allen Slipper  / Laura Peat)    Physician Order: IP Consult to Occupational Therapy  Reason for Therapy: ADL/IADL Dysfu Recommendations:  (home with family )  OT Device Recommendations:  (RW/ cane)    PLAN  OT Treatment Plan: Balance activities; Energy conservation/work simplification techniques;ADL training;IADL training;Functional transfer training;UE strengthening/ROM;End limits     STRENGTH ASSESSMENT  Upper extremity strength is within functional limits          ACTIVITIES OF DAILY LIVING ASSESSMENT  AM-PAC ‘6-Clicks’ Inpatient Daily Activity Short Form  How much help from another person does the patient currently need…

## 2017-12-01 NOTE — SIGNIFICANT EVENT
Heparin drip initiated after CT chest revealed PE in right lung. Patient less drowsy this evening, voided at 1930 375 cc. Patient having US of leg tonight for rule out bilateral DVT. Report given to night RN.

## 2017-12-02 PROCEDURE — 80048 BASIC METABOLIC PNL TOTAL CA: CPT | Performed by: INTERNAL MEDICINE

## 2017-12-02 PROCEDURE — 97110 THERAPEUTIC EXERCISES: CPT

## 2017-12-02 PROCEDURE — 97116 GAIT TRAINING THERAPY: CPT

## 2017-12-02 PROCEDURE — 94640 AIRWAY INHALATION TREATMENT: CPT

## 2017-12-02 PROCEDURE — 85730 THROMBOPLASTIN TIME PARTIAL: CPT | Performed by: INTERNAL MEDICINE

## 2017-12-02 PROCEDURE — 85027 COMPLETE CBC AUTOMATED: CPT | Performed by: NURSE PRACTITIONER

## 2017-12-02 RX ORDER — HYDROCODONE BITARTRATE AND ACETAMINOPHEN 7.5; 325 MG/1; MG/1
2 TABLET ORAL EVERY 6 HOURS PRN
Qty: 90 TABLET | Refills: 0 | Status: SHIPPED | OUTPATIENT
Start: 2017-12-02 | End: 2018-02-19 | Stop reason: ALTCHOICE

## 2017-12-02 NOTE — CM/SW NOTE
SW was informed that pt is still on O2 and would need Home O2. RIGO went through MD notes, Pulmonary embolism, hypoxia, and hypertension do not qualify pt for Home O2 per HME. RIGO relayed over to [facility], [address]

## 2017-12-02 NOTE — PHYSICAL THERAPY NOTE
PHYSICAL THERAPY KNEE TREATMENT NOTE - INPATIENT     Room Number: 447/447-A             Presenting Problem: s/p left total knee arthroplasty    Problem List  Active Problems:    Preop testing      ASSESSMENT   Chart reviewed. RN aware.   Awaiting tobin Sitting: Good  Static Standing: Fair  Dynamic Standing: Fair    ACTIVITY TOLERANCE  In bed at rest on 2L O2- 96%. HR 85. AM-PAC '6-Clicks' INPATIENT SHORT FORM - BASIC MOBILITY  How much difficulty does the patient currently have. ..  -   Turning over in independent   Goal #1   Current Status  MI    Goal #2 Patient is able to demonstrate transfers Sit to/from Stand at assistance level: modified independent   Goal #2  Current Status  Progressing, supervision to mod I level   Goal #3 Patient is able to ambul

## 2017-12-02 NOTE — PROGRESS NOTES
[facility]    Progress Note    Sumit Rosario Patient Status:  Inpatient    3/21/1950 MRN [id number]   Location [facility] 4W/SW/SE Attending Bonilla Martni MD   Hosp Day # 3 PCP Jameel Lainez MD       Subjective:   Sumit Rosario (utf=[id number])    Result Date: 11/30/2017  CONCLUSION:  1. The right hilum is prominent and is more conspicuous than seen previously. Recommend dedicated CT of the chest with contrast to further assess. A mass or adenopathy should be excluded.  No acute airspa

## 2017-12-02 NOTE — PHYSICAL THERAPY NOTE
PHYSICAL THERAPY KNEE TREATMENT NOTE - INPATIENT     Room Number: 447/447-A             Presenting Problem: s/p left total knee arthroplasty    Problem List  Active Problems:    Preop testing      ASSESSMENT   Patient seen for BID therapy sessions.  RN Col training;Transfer training;Balance training    SUBJECTIVE  \"I am feeling better this afternoon\"    OBJECTIVE  Precautions:  (2 L of 02)    WEIGHT BEARING STATUS  Weight Bearing Restriction: L lower extremity           L Lower Extremity: Weight Bearing as 20 reps   Hip Abd/Add 20 reps 20 reps   Heel slides 20 reps 20 reps   Saq 20 reps 20 reps   SLR 20 reps 20 reps   Sitting Knee Flexion 20 reps 20 reps   Standing heel/toe raises 0 reps 5 reps   Standing knee flexion 0 reps 5 reps   Extension stretch  1x 1x

## 2017-12-02 NOTE — PROGRESS NOTES
Pulmonary Progress Note        NAME: Luc Cline - ROOM: [address] [place] [address]O - MRN: [id number] - Age: 75year old - : 3/21/1950    Past Medical History:   Diagnosis Date   • Depression    • Esophageal reflux    • High blood pressure    • High cholesterol    • O 12.9  13.1   WBC  11.0  10.8  10.3   PLT  228  210  218         Recent Labs   Lab  11/30/17   0514  11/30/17   1813  12/02/17   0521   GLU  113*  134*  130*   BUN  13  10  7*   CREATSERUM  0.92  0.81  0.83   GFRAA  >60  >60  >60   GFRNAA  >60  >60  >60   C

## 2017-12-02 NOTE — PROGRESS NOTES
[facility]    Progress Note    Mp Splinter Patient Status:  Surgery Admit    3/21/1950 MRN [id number]   Location [address] [facility] Attending Adilson Lee MD   Hosp Day # 3 PCP Scott Ferrell MD       Subjective:   Radha Painter CL  99  93*  93*   CO2  26  26  29       No results for input(s): LIP, KEYSHAWN in the last 72 hours. No results for input(s): TROP, CK in the last 72 hours. No results for input(s): PT, INR in the last 72 hours.     No results for input(s): PGLU in the right lower lobe. 2. Dilatation of the main pulmonary artery trunk may relate to underlying pulmonary hypertension. Enlarged right main pulmonary artery may have reflected prior radiographic abnormality. 3. Cardiomegaly.  4. Diffuse interlobular septal thic

## 2017-12-03 VITALS
TEMPERATURE: 98 F | RESPIRATION RATE: 18 BRPM | BODY MASS INDEX: 36 KG/M2 | HEART RATE: 92 BPM | SYSTOLIC BLOOD PRESSURE: 128 MMHG | OXYGEN SATURATION: 95 % | DIASTOLIC BLOOD PRESSURE: 62 MMHG | WEIGHT: 224 LBS | HEIGHT: 66 IN

## 2017-12-03 PROCEDURE — 97116 GAIT TRAINING THERAPY: CPT

## 2017-12-03 PROCEDURE — 94640 AIRWAY INHALATION TREATMENT: CPT

## 2017-12-03 PROCEDURE — 85049 AUTOMATED PLATELET COUNT: CPT | Performed by: INTERNAL MEDICINE

## 2017-12-03 NOTE — CM/SW NOTE
SW was informed that pt has been weaned off of O2 and is able to d/c today    SW notified [facility] [address], [facility] ext [phone]

## 2017-12-03 NOTE — PROGRESS NOTES
Pulmonary Progress Note        NAME: Elene Mortimer - ROOM: [facility]/319-F - MRN: [id number] - Age: 75year old - : 3/21/1950    Past Medical History:   Diagnosis Date   • Depression    • Esophageal reflux    • High blood pressure    • High cholesterol    • O 97.6  96.1  96.7   --    MCH  33.3*  33.6*  33.6*   --    MCHC  34.1  35.0  34.7   --    RDW  12.9  12.9  13.1   --    WBC  11.0  10.8  10.3   --    PLT  228  210  218  240         Recent Labs   Lab  11/30/17   1813  12/02/17   0521   GLU  134*  130*   BU

## 2017-12-03 NOTE — PLAN OF CARE
DISCHARGE PLANNING    • Discharge to home or other facility with appropriate resources Adequate for Discharge        GENITOURINARY - ADULT    • Absence of urinary retention Adequate for Discharge        Impaired Functional Mobility    • Achieve highest/saf

## 2017-12-03 NOTE — PHYSICAL THERAPY NOTE
PHYSICAL THERAPY KNEE TREATMENT NOTE - INPATIENT     Room Number: 440/440-A             Presenting Problem: s/p left total knee arthroplasty    Problem List  Active Problems:    Preop testing      ASSESSMENT   AM SESSION: Spoke with nurse prior to seeing Sitting: Good  Static Standing: Fair  Dynamic Standing: Fair    ACTIVITY TOLERANCE  O2 Saturation: 93%  Room air  No shortness of breath  Heart Rate: 91    AM-PAC '6-Clicks' INPATIENT SHORT FORM - BASIC MOBILITY  How much difficulty does the patient amanda ambulate 300 feet with assistive device at assistance level: modified independent    Goal #3   Current Status 75' x 2 today with no O2, RW and mod I - 12/3/17.    Goal #4 Patient will negotiate 12 stairs/one curb w/ assistive device and supervision   Goal #

## 2017-12-03 NOTE — PROGRESS NOTES
[facility]    Progress Note    Samanlaurelgopal Kin Patient Status:  Surgery Admit    3/21/1950 MRN [id number]   Location [address] [facility] [address] Attending Fela Mederos MD   Hosp Day # 4 PCP Brian Huynh MD       Subjective:   Elias Scruggs 7*   CREATSERUM  0.81  0.83   GFRAA  >60  >60   GFRNAA  >60  >60   CA  8.7  8.7   NA  127*  128*   K  3.5  3.7   CL  93*  93*   CO2  26  29       No results for input(s): LIP, KEYSHAWN in the last 72 hours.     No results for input(s): TROP, CK in the last 72 ho

## 2017-12-03 NOTE — PHYSICAL THERAPY NOTE
Patient seen for am session. She was OOB, amb in hallway, and did stairs. See pm note for details on morning session.

## 2017-12-03 NOTE — PLAN OF CARE
DISCHARGE PLANNING    • Discharge to home or other facility with appropriate resources Progressing        GENITOURINARY - ADULT    • Absence of urinary retention Progressing        Impaired Functional Mobility    • Achieve highest/safest level of mobility/

## 2017-12-06 NOTE — DISCHARGE SUMMARY
Discharge summary:    Admitted for a L TKA for endstage DJD on 11/29/2017. Did well regarding knee but was diagnosed with a PE postop and treated with Xarelto by pulm and PMD.  Progressed with PT over her stay and her labs were stable postop.   Cleared for

## 2017-12-07 NOTE — PAYOR COMM NOTE
PER [facility]ANGE INPT AUTH [id number]- APPROVED FOR 2 DAYS- REQUESTING ADDITIONAL DAYS    12/1/17  Pulmonary Progress Note      Assessment / Plan:  1. Hypoxia/dyspnea - due to PE  - wean O2 as able  - ac as below  2. Pulmonary embolism - provoked.  LE duplex n prophylaxis  Xarelto     Physical therapy     Acute blood loss anemia  Monitor     Mild hyponatremia  Repeat in the morning     Hypoxia  Probably multifactorial however patient will require pulmonary function testing and sleep study as an outpatient.   Pulm

## 2017-12-22 PROBLEM — R06.83 SNORING: Status: ACTIVE | Noted: 2017-12-22

## 2017-12-22 PROBLEM — I26.99 OTHER PULMONARY EMBOLISM WITHOUT ACUTE COR PULMONALE (HCC): Status: ACTIVE | Noted: 2017-12-22

## 2017-12-22 PROBLEM — G47.33 OSA (OBSTRUCTIVE SLEEP APNEA): Status: ACTIVE | Noted: 2017-12-22

## 2018-01-23 ENCOUNTER — LAB ENCOUNTER (OUTPATIENT)
Dept: LAB | Facility: HOSPITAL | Age: 68
End: 2018-01-23
Attending: INTERNAL MEDICINE
Payer: COMMERCIAL

## 2018-01-23 DIAGNOSIS — I25.10 CORONARY ATHEROSCLEROSIS OF NATIVE CORONARY ARTERY: Primary | ICD-10-CM

## 2018-01-23 LAB
CHOLEST SERPL-MCNC: 151 MG/DL (ref 110–200)
HDLC SERPL-MCNC: 52 MG/DL
LDLC SERPL CALC-MCNC: 73 MG/DL (ref 0–99)
NONHDLC SERPL-MCNC: 99 MG/DL
TRIGL SERPL-MCNC: 128 MG/DL (ref 1–149)

## 2018-01-23 PROCEDURE — 80061 LIPID PANEL: CPT

## 2018-01-23 PROCEDURE — 36415 COLL VENOUS BLD VENIPUNCTURE: CPT

## 2018-04-13 PROBLEM — R91.1 PULMONARY NODULE: Status: ACTIVE | Noted: 2018-04-13

## 2018-11-27 ENCOUNTER — LAB ENCOUNTER (OUTPATIENT)
Dept: LAB | Facility: HOSPITAL | Age: 68
End: 2018-11-27
Attending: INTERNAL MEDICINE
Payer: MEDICARE

## 2018-11-27 DIAGNOSIS — R19.7 DIARRHEA: Primary | ICD-10-CM

## 2018-11-27 PROCEDURE — 87507 IADNA-DNA/RNA PROBE TQ 12-25: CPT

## 2018-12-17 ENCOUNTER — APPOINTMENT (OUTPATIENT)
Dept: CT IMAGING | Facility: HOSPITAL | Age: 68
DRG: 244 | End: 2018-12-17
Attending: EMERGENCY MEDICINE
Payer: MEDICARE

## 2018-12-17 ENCOUNTER — HOSPITAL ENCOUNTER (INPATIENT)
Facility: HOSPITAL | Age: 68
LOS: 1 days | Discharge: HOME | DRG: 244 | End: 2018-12-19
Attending: EMERGENCY MEDICINE | Admitting: INTERNAL MEDICINE
Payer: MEDICARE

## 2018-12-17 DIAGNOSIS — S09.90XA INJURY OF HEAD, INITIAL ENCOUNTER: ICD-10-CM

## 2018-12-17 DIAGNOSIS — R55 SYNCOPE, UNSPECIFIED SYNCOPE TYPE: Primary | ICD-10-CM

## 2018-12-17 PROCEDURE — 80048 BASIC METABOLIC PNL TOTAL CA: CPT | Performed by: EMERGENCY MEDICINE

## 2018-12-17 PROCEDURE — 70450 CT HEAD/BRAIN W/O DYE: CPT | Performed by: EMERGENCY MEDICINE

## 2018-12-17 PROCEDURE — 85025 COMPLETE CBC W/AUTO DIFF WBC: CPT | Performed by: EMERGENCY MEDICINE

## 2018-12-17 PROCEDURE — 36415 COLL VENOUS BLD VENIPUNCTURE: CPT

## 2018-12-17 PROCEDURE — 84484 ASSAY OF TROPONIN QUANT: CPT | Performed by: EMERGENCY MEDICINE

## 2018-12-17 PROCEDURE — 71260 CT THORAX DX C+: CPT | Performed by: EMERGENCY MEDICINE

## 2018-12-17 PROCEDURE — 93010 ELECTROCARDIOGRAM REPORT: CPT | Performed by: EMERGENCY MEDICINE

## 2018-12-17 PROCEDURE — 93005 ELECTROCARDIOGRAM TRACING: CPT

## 2018-12-17 PROCEDURE — 99285 EMERGENCY DEPT VISIT HI MDM: CPT

## 2018-12-17 PROCEDURE — 81001 URINALYSIS AUTO W/SCOPE: CPT | Performed by: EMERGENCY MEDICINE

## 2018-12-17 PROCEDURE — 80076 HEPATIC FUNCTION PANEL: CPT | Performed by: EMERGENCY MEDICINE

## 2018-12-17 RX ORDER — PAROXETINE HYDROCHLORIDE 20 MG/1
20 TABLET, FILM COATED ORAL DAILY
Status: DISCONTINUED | OUTPATIENT
Start: 2018-12-17 | End: 2018-12-19

## 2018-12-17 RX ORDER — HYDRALAZINE HYDROCHLORIDE 25 MG/1
25 TABLET, FILM COATED ORAL 3 TIMES DAILY
Status: DISCONTINUED | OUTPATIENT
Start: 2018-12-17 | End: 2018-12-17

## 2018-12-17 RX ORDER — AMLODIPINE BESYLATE 5 MG/1
5 TABLET ORAL DAILY
Status: DISCONTINUED | OUTPATIENT
Start: 2018-12-18 | End: 2018-12-19

## 2018-12-17 RX ORDER — ACETAMINOPHEN 500 MG
1000 TABLET ORAL ONCE
Status: COMPLETED | OUTPATIENT
Start: 2018-12-17 | End: 2018-12-17

## 2018-12-17 RX ORDER — POTASSIUM CHLORIDE 20 MEQ/1
40 TABLET, EXTENDED RELEASE ORAL ONCE
Status: COMPLETED | OUTPATIENT
Start: 2018-12-17 | End: 2018-12-17

## 2018-12-17 RX ORDER — BENZONATATE 100 MG/1
100 CAPSULE ORAL 3 TIMES DAILY PRN
Status: DISCONTINUED | OUTPATIENT
Start: 2018-12-17 | End: 2018-12-19

## 2018-12-17 RX ORDER — SODIUM CHLORIDE 0.9 % (FLUSH) 0.9 %
3 SYRINGE (ML) INJECTION AS NEEDED
Status: DISCONTINUED | OUTPATIENT
Start: 2018-12-17 | End: 2018-12-19

## 2018-12-17 RX ORDER — ACETAMINOPHEN 325 MG/1
650 TABLET ORAL 4 TIMES DAILY PRN
Status: DISCONTINUED | OUTPATIENT
Start: 2018-12-17 | End: 2018-12-18

## 2018-12-17 RX ORDER — HEPARIN SODIUM 5000 [USP'U]/ML
5000 INJECTION, SOLUTION INTRAVENOUS; SUBCUTANEOUS EVERY 12 HOURS SCHEDULED
Status: DISCONTINUED | OUTPATIENT
Start: 2018-12-18 | End: 2018-12-19

## 2018-12-17 RX ORDER — PANTOPRAZOLE SODIUM 20 MG/1
20 TABLET, DELAYED RELEASE ORAL
Status: DISCONTINUED | OUTPATIENT
Start: 2018-12-18 | End: 2018-12-19

## 2018-12-17 RX ORDER — ATORVASTATIN CALCIUM 40 MG/1
40 TABLET, FILM COATED ORAL NIGHTLY
Status: DISCONTINUED | OUTPATIENT
Start: 2018-12-17 | End: 2018-12-19

## 2018-12-17 NOTE — ED PROVIDER NOTES
Patient Seen in: [facility] Emergency Department    History   Patient presents with:  Syncope (cardiovascular, neurologic)    Stated Complaint: Syncope (x2)     HPI    Patient is a 39-year-old female with a history of high blood pressure high chol complaint: Syncope (x2)   Other systems are as noted in HPI. Constitutional and vital signs reviewed. All other systems reviewed and negative except as noted above.     Physical Exam     ED Triage Vitals [12/17/18 1301]   /72   Pulse 79   Resp 2 Notable for the following components:    RBC 3.59 (*)     HGB 11.9 (*)     HCT 33.5 (*)     MCH 33.1 (*)     MPV 10.5 (*)     All other components within normal limits   TROPONIN I - Normal   CBC WITH DIFFERENTIAL WITH PLATELET    Narrative:      The follow

## 2018-12-17 NOTE — ED NOTES
Patient received in sign out. Labs and imaging reviewed and d/w Dr Anu Rivas. Will admit to tele obs for syncope. Seen in ED by Dr Anu Rivas.

## 2018-12-17 NOTE — ED NOTES
Pt presents to ED for two syncope episodes this morning. Pt states she has not been feeling well the last few days and this morning fainted twice, a few hours apart. Pt states she hit the back of her head when she fell.

## 2018-12-17 NOTE — ED INITIAL ASSESSMENT (HPI)
Pt to ER s/p syncopal episode x2 today. Pt states she hit head on hard floor. +LOC. +hematoma noted to right posterior head. Pt is alert and oriented x4. Pt ambulating with steady gait. Pt c/o lightheadedness. Pt denies nausea or vomiting.  +headache. +pupi

## 2018-12-18 ENCOUNTER — APPOINTMENT (OUTPATIENT)
Dept: GENERAL RADIOLOGY | Facility: HOSPITAL | Age: 68
DRG: 244 | End: 2018-12-18
Attending: INTERNAL MEDICINE
Payer: MEDICARE

## 2018-12-18 ENCOUNTER — APPOINTMENT (OUTPATIENT)
Dept: CV DIAGNOSTICS | Facility: HOSPITAL | Age: 68
DRG: 244 | End: 2018-12-18
Attending: INTERNAL MEDICINE
Payer: MEDICARE

## 2018-12-18 ENCOUNTER — APPOINTMENT (OUTPATIENT)
Dept: INTERVENTIONAL RADIOLOGY/VASCULAR | Facility: HOSPITAL | Age: 68
DRG: 244 | End: 2018-12-18
Attending: INTERNAL MEDICINE
Payer: MEDICARE

## 2018-12-18 PROCEDURE — 83735 ASSAY OF MAGNESIUM: CPT | Performed by: EMERGENCY MEDICINE

## 2018-12-18 PROCEDURE — 85025 COMPLETE CBC W/AUTO DIFF WBC: CPT | Performed by: EMERGENCY MEDICINE

## 2018-12-18 PROCEDURE — 33208 INSRT HEART PM ATRIAL & VENT: CPT

## 2018-12-18 PROCEDURE — 99153 MOD SED SAME PHYS/QHP EA: CPT

## 2018-12-18 PROCEDURE — 93010 ELECTROCARDIOGRAM REPORT: CPT | Performed by: EMERGENCY MEDICINE

## 2018-12-18 PROCEDURE — 99152 MOD SED SAME PHYS/QHP 5/>YRS: CPT

## 2018-12-18 PROCEDURE — 84132 ASSAY OF SERUM POTASSIUM: CPT | Performed by: INTERNAL MEDICINE

## 2018-12-18 PROCEDURE — 02H63JZ INSERTION OF PACEMAKER LEAD INTO RIGHT ATRIUM, PERCUTANEOUS APPROACH: ICD-10-PCS | Performed by: INTERNAL MEDICINE

## 2018-12-18 PROCEDURE — 0JH606Z INSERTION OF PACEMAKER, DUAL CHAMBER INTO CHEST SUBCUTANEOUS TISSUE AND FASCIA, OPEN APPROACH: ICD-10-PCS | Performed by: INTERNAL MEDICINE

## 2018-12-18 PROCEDURE — 02HK3JZ INSERTION OF PACEMAKER LEAD INTO RIGHT VENTRICLE, PERCUTANEOUS APPROACH: ICD-10-PCS | Performed by: INTERNAL MEDICINE

## 2018-12-18 PROCEDURE — A4216 STERILE WATER/SALINE, 10 ML: HCPCS | Performed by: INTERNAL MEDICINE

## 2018-12-18 PROCEDURE — 80048 BASIC METABOLIC PNL TOTAL CA: CPT | Performed by: EMERGENCY MEDICINE

## 2018-12-18 PROCEDURE — 93005 ELECTROCARDIOGRAM TRACING: CPT

## 2018-12-18 PROCEDURE — 71045 X-RAY EXAM CHEST 1 VIEW: CPT | Performed by: INTERNAL MEDICINE

## 2018-12-18 PROCEDURE — 87641 MR-STAPH DNA AMP PROBE: CPT | Performed by: INTERNAL MEDICINE

## 2018-12-18 RX ORDER — SODIUM CHLORIDE 9 MG/ML
INJECTION, SOLUTION INTRAVENOUS CONTINUOUS
Status: DISCONTINUED | OUTPATIENT
Start: 2018-12-18 | End: 2018-12-19

## 2018-12-18 RX ORDER — CEFAZOLIN SODIUM/WATER 2 G/20 ML
2 SYRINGE (ML) INTRAVENOUS
Status: COMPLETED | OUTPATIENT
Start: 2018-12-18 | End: 2018-12-18

## 2018-12-18 RX ORDER — ACETAMINOPHEN 325 MG/1
650 TABLET ORAL EVERY 4 HOURS PRN
Status: DISCONTINUED | OUTPATIENT
Start: 2018-12-18 | End: 2018-12-19

## 2018-12-18 RX ORDER — ACETAMINOPHEN AND CODEINE PHOSPHATE 300; 30 MG/1; MG/1
2 TABLET ORAL EVERY 4 HOURS PRN
Status: DISCONTINUED | OUTPATIENT
Start: 2018-12-18 | End: 2018-12-19

## 2018-12-18 RX ORDER — ONDANSETRON 2 MG/ML
4 INJECTION INTRAMUSCULAR; INTRAVENOUS EVERY 6 HOURS PRN
Status: DISCONTINUED | OUTPATIENT
Start: 2018-12-18 | End: 2018-12-19

## 2018-12-18 RX ORDER — DOPAMINE HYDROCHLORIDE 320 MG/100ML
INJECTION, SOLUTION INTRAVENOUS CONTINUOUS
Status: DISCONTINUED | OUTPATIENT
Start: 2018-12-18 | End: 2018-12-18

## 2018-12-18 RX ORDER — CHLORHEXIDINE GLUCONATE 4 G/100ML
30 SOLUTION TOPICAL
Status: COMPLETED | OUTPATIENT
Start: 2018-12-18 | End: 2018-12-18

## 2018-12-18 RX ORDER — MIDAZOLAM HYDROCHLORIDE 1 MG/ML
INJECTION INTRAMUSCULAR; INTRAVENOUS
Status: COMPLETED
Start: 2018-12-18 | End: 2018-12-18

## 2018-12-18 RX ORDER — ACETAMINOPHEN AND CODEINE PHOSPHATE 300; 30 MG/1; MG/1
1 TABLET ORAL EVERY 4 HOURS PRN
Status: DISCONTINUED | OUTPATIENT
Start: 2018-12-18 | End: 2018-12-19

## 2018-12-18 RX ORDER — DIPHENHYDRAMINE HYDROCHLORIDE 50 MG/ML
INJECTION INTRAMUSCULAR; INTRAVENOUS
Status: DISCONTINUED
Start: 2018-12-18 | End: 2018-12-18 | Stop reason: WASHOUT

## 2018-12-18 RX ORDER — CEFAZOLIN SODIUM/WATER 2 G/20 ML
2 SYRINGE (ML) INTRAVENOUS EVERY 8 HOURS
Status: COMPLETED | OUTPATIENT
Start: 2018-12-18 | End: 2018-12-19

## 2018-12-18 RX ORDER — MAGNESIUM OXIDE 400 MG (241.3 MG MAGNESIUM) TABLET
400 TABLET ONCE
Status: COMPLETED | OUTPATIENT
Start: 2018-12-18 | End: 2018-12-18

## 2018-12-18 NOTE — RESPIRATORY THERAPY NOTE
ALEKSANDAR ASSESSMENT:    Pt does not have a previous diagnosis of ALEKSANDAR. Pt does not routinely use a CPAP device at home. This pt is not suspected to be at high risk for ALEKSANDAR and sleep lab packet was not provided to patient for outpatient follow-up.

## 2018-12-18 NOTE — PROGRESS NOTES
Patient had another syncopal episode about 8 second pause. Now awake alert. Notified Dr. Lucia Valencia to expedite pacemaker placement. Will start Dopamine.

## 2018-12-18 NOTE — H&P
[facility]    PATIENT'S NAME: Davide Stewart   ATTENDING PHYSICIAN: Jayesh Hutchins MD   PATIENT ACCOUNT#:   [id number]    LOCATION:  [address] [place] [address] RECORD #:   [id number]       YOB: 1950  ADMISSION DATE:       12/17/2018 Father  at 76 of emphysema, smoking-related. Mother  at 80 with breast cancer. SOCIAL HISTORY:  The patient is a nonsmoker. No recreational drugs, and she drinks 1 to 2 drinks 2 to 3 times a week.   The patient lives with her daughter in [address] [place] [address] unit.  A cardiology consultation obtained with Dr. Lucia Valencia. The patient has had recent echocardiogram as an outpatient. Further recommendations pending clinical course. Dictated By Jamir Carvajal MD  d: 12/17/2018 18:09:24  t: 12/17/2018 18:52:18

## 2018-12-18 NOTE — SIGNIFICANT EVENT
Code Blue called after patient went unresponsive. Per nurse sh was going to the bathroom felt weak and light headed, then passed out. Spontaneously resumed activity. Currently complains of feeling very tired, no chest pain or shortness of breath.   Patie

## 2018-12-18 NOTE — PROGRESS NOTES
[facility]    Progress Note    Melissa Franklin Patient Status:  Inpatient    3/21/1950 MRN [id number]   Location [facility] 2W/SW Attending Medina Butler MD   Hosp Day # 0 PCP Brandt Reyez MD       Subjective:   Melissa [place] is Range Status   12/18/2018 1.02 0.50 - 1.50 mg/dL Final   12/17/2018 0.97 0.50 - 1.50 mg/dL Final   12/02/2017 0.83 0.50 - 1.50 mg/dL Final   11/30/2017 0.81 0.50 - 1.50 mg/dL Final   11/30/2017 0.92 0.50 - 1.50 mg/dL Final   10/04/2017 0.92 0.50 - 1.50 mg/ Range Status   12/18/2018 6.9 4.0 - 11.0 K/UL Final   12/17/2018 7.8 4.0 - 11.0 K/UL Final   12/02/2017 10.3 4.0 - 11.0 K/UL Final   12/01/2017 10.8 4.0 - 11.0 K/UL Final   11/30/2017 11.0 4.0 - 11.0 K/UL Final   11/30/2017 10.6 4.0 - 11.0 K/UL Final     H Date: 12/17/2018  CONCLUSION:  1. No evidence of acute pulmonary embolism. Previously noted right lower lobe segmental branch pulmonary embolism appears resolved.  2. Dilatation of the main pulmonary artery trunk may relate to underlying pulmonary hypertens

## 2018-12-18 NOTE — PLAN OF CARE
CARDIOVASCULAR - ADULT    • Maintains optimal cardiac output and hemodynamic stability Not Progressing          NEUROLOGICAL - ADULT    • Achieves stable or improved neurological status Progressing        Patient Centered Care    • Patient preferences are

## 2018-12-18 NOTE — PLAN OF CARE
CARDIOVASCULAR - ADULT    • Maintains optimal cardiac output and hemodynamic stability Progressing        NEUROLOGICAL - ADULT    • Achieves stable or improved neurological status Progressing        Patient Centered Care    • Patient preferences are identi

## 2018-12-18 NOTE — CONSULTS
Patient is a 70year old female who was admitted to the hospital for Syncope, unspecified syncope type: Had 2 syncopal events at home.  Here at [facility] she had 2 episodes of syncope with 14 second pause and 9 second pause with complete heart bloc sodium chloride 0.9 % 250 mL IVPB 40 mEq Intravenous Once   Atropine Sulfate 0.1 MG/ML injection      DOPamine in D5W (INTROPIN) 800 mg/250 ml premix infusion 1.5-20 mcg/kg/min Intravenous Continuous   AmLODIPine Besylate (NORVASC) tab 5 mg 5 mg Oral Daily shift: No intake/output data recorded.      Vent Settings:      Hemodynamic parameters (last 24 hours):      Scheduled Meds:   • Chlorhexidine Gluconate  30 mL Topical On Call   • ceFAZolin  2 g Intravenous 30 Min Pre-Op   • potassium chloride 40mEq IVPB (p heart block episodes, bifascicular block on EKG and second EKG with Mobitz 2 block. CAD, mild by cardiac cath 2017    Moderate AI by echo 3/18.     Plan:  Emergent permanent pacemaker, risks/benefits discussed including bleeding, infection, injury to art

## 2018-12-18 NOTE — PROCEDURES
[facility]    PATIENT'S NAME: Gold Fine   ATTENDING PHYSICIAN: Jayesh Montez MD   OPERATING PHYSICIAN: Jc Noble DO   PATIENT ACCOUNT#:   [de-identified]    LOCATION:  [address] [place] [address] #:   [id number]       DATE OF BIRTH:  [birth year]/ lead was taken to the lateral portion of the right atrium and we did adequately achieve sensing and pacing. We did have some T-wave oversensing, which we were able to overcome through the device.   No diaphragmatic stimulation at 10 V, and we did deploy th patient will need to follow up in the office for pacemaker interrogation.     Dictated By Fanny Milian DO  d: 12/18/2018 09:19:48  t: 12/18/2018 10:34:24  [facility] [id number]  AS/

## 2018-12-18 NOTE — PLAN OF CARE
Problem: Patient Centered Care  Goal: Patient preferences are identified and integrated in the patient's plan of care  Interventions:  - What would you like us to know as we care for you?  I live at home alone, my daughter is very involved in my care  - Pro neurological status  - Initiate measures to prevent increased intracranial pressure  - Maintain blood pressure and fluid volume within ordered parameters to optimize cerebral perfusion and minimize risk of hemorrhage  - Monitor temperature, glucose, and so

## 2018-12-18 NOTE — OPERATIVE REPORT
Preop diagnosis: episodic complete heart block  Post op diagnosis: same  Procedures: medtronic ddd ppm  Findings: as above  EBL: 20 mls  Specimens: None

## 2018-12-19 ENCOUNTER — APPOINTMENT (OUTPATIENT)
Dept: CV DIAGNOSTICS | Facility: HOSPITAL | Age: 68
DRG: 244 | End: 2018-12-19
Attending: INTERNAL MEDICINE
Payer: MEDICARE

## 2018-12-19 VITALS
DIASTOLIC BLOOD PRESSURE: 83 MMHG | SYSTOLIC BLOOD PRESSURE: 143 MMHG | WEIGHT: 223 LBS | BODY MASS INDEX: 35.84 KG/M2 | RESPIRATION RATE: 16 BRPM | HEART RATE: 72 BPM | TEMPERATURE: 98 F | OXYGEN SATURATION: 96 % | HEIGHT: 66 IN

## 2018-12-19 PROCEDURE — 83735 ASSAY OF MAGNESIUM: CPT | Performed by: INTERNAL MEDICINE

## 2018-12-19 PROCEDURE — 84132 ASSAY OF SERUM POTASSIUM: CPT | Performed by: INTERNAL MEDICINE

## 2018-12-19 PROCEDURE — 93306 TTE W/DOPPLER COMPLETE: CPT | Performed by: INTERNAL MEDICINE

## 2018-12-19 PROCEDURE — 90471 IMMUNIZATION ADMIN: CPT

## 2018-12-19 RX ORDER — MAGNESIUM OXIDE 400 MG (241.3 MG MAGNESIUM) TABLET
400 TABLET ONCE
Status: COMPLETED | OUTPATIENT
Start: 2018-12-19 | End: 2018-12-19

## 2018-12-19 NOTE — PROGRESS NOTES
[facility]    Progress Note    Loco Alan Patient Status:  Inpatient    3/21/1950 MRN [id number]   Location [facility] 2W/SW Attending Kathleen Plaza MD   Hosp Day # 1 PCP Sabino Guajardo MD       Subjective:   Loco Alan is results for input(s): LIP, KEYSHAWN in the last 168 hours. Recent Labs   Lab  12/17/18   1330   TROP  0.03       No results for input(s): PT, INR in the last 168 hours. No results for input(s): PGLU in the last 168 hours.     Creatinine   Date Value Ref Ra GFR, Non-   Date Value Ref Range Status   12/18/2018 56 (L) >=60 Final   12/17/2018 60 >=60 Final   12/02/2017 >60 >=60 Final   11/30/2017 >60 >=60 Final   11/30/2017 >60 >=60 Final   10/04/2017 >60 >=60 Final     WBC   Date Value Ref R Xr Chest Ap Portable  (cpt=71045)    Result Date: 12/18/2018  CONCLUSION:  1. Mild cardiomegaly. Tortuous atherosclerotic aorta. 2. No acute pulmonary disease. 3. Transvenous pacing leads in the right atrium and right ventricle.     Dictated by (CST): PILAR MD    Ekg 12-lead    Result Date: 12/17/2018  ECG Report  Interpretation  -------------------------- Sinus Rhythm -Right bundle branch block with left axis -bifascicular block.  ABNORMAL When compared with ECG of 09/28/2017 15:33:10 Bifascicular block prese

## 2018-12-19 NOTE — CM/SW NOTE
12/19/18 0900   Discharge disposition   Expected discharge disposition Home or Self   Discharge transportation Private car    Home Self care,  Daughter will provide transportation home.      / to remain available for support and/

## 2018-12-19 NOTE — PROGRESS NOTES
L chest wall intact. Dressing removed. No bleeding. Pacemaker parameters normal.  CXR without pneumothorax. Instructions given again about activity. Ok to discharge.

## 2019-01-02 ENCOUNTER — LAB ENCOUNTER (OUTPATIENT)
Dept: LAB | Facility: HOSPITAL | Age: 69
End: 2019-01-02
Attending: INTERNAL MEDICINE
Payer: MEDICARE

## 2019-01-02 DIAGNOSIS — M19.042 ARTHRITIS OF BOTH HANDS: Primary | ICD-10-CM

## 2019-01-02 DIAGNOSIS — M19.041 ARTHRITIS OF BOTH HANDS: Primary | ICD-10-CM

## 2019-01-02 LAB — RHEUMATOID FACT SER QL: <5 IU/ML

## 2019-01-02 PROCEDURE — 86038 ANTINUCLEAR ANTIBODIES: CPT

## 2019-01-02 PROCEDURE — 36415 COLL VENOUS BLD VENIPUNCTURE: CPT

## 2019-01-02 PROCEDURE — 86431 RHEUMATOID FACTOR QUANT: CPT

## 2019-01-03 LAB — NUCLEAR IGG TITR SER IF: NEGATIVE {TITER}

## 2019-03-21 ENCOUNTER — LAB ENCOUNTER (OUTPATIENT)
Dept: LAB | Facility: HOSPITAL | Age: 69
End: 2019-03-21
Attending: INTERNAL MEDICINE
Payer: MEDICARE

## 2019-03-21 DIAGNOSIS — I25.10 CORONARY ATHEROSCLEROSIS OF NATIVE CORONARY ARTERY: Primary | ICD-10-CM

## 2019-03-21 LAB
CHOLEST SMN-MCNC: 147 MG/DL (ref ?–200)
HDLC SERPL-MCNC: 56 MG/DL (ref 40–59)
LDLC SERPL CALC-MCNC: 63 MG/DL (ref ?–100)
NONHDLC SERPL-MCNC: 91 MG/DL (ref ?–130)
TRIGL SERPL-MCNC: 139 MG/DL (ref 30–149)
VLDLC SERPL CALC-MCNC: 28 MG/DL (ref 0–30)

## 2019-03-21 PROCEDURE — 80061 LIPID PANEL: CPT

## 2019-03-21 PROCEDURE — 36415 COLL VENOUS BLD VENIPUNCTURE: CPT

## 2019-04-01 ENCOUNTER — HOSPITAL ENCOUNTER (OUTPATIENT)
Dept: RESPIRATORY THERAPY | Facility: HOSPITAL | Age: 69
Discharge: HOME | End: 2019-04-01
Attending: INTERNAL MEDICINE
Payer: MEDICARE

## 2019-04-01 DIAGNOSIS — R06.02 SOB (SHORTNESS OF BREATH): ICD-10-CM

## 2019-04-01 DIAGNOSIS — R05.9 COUGH: ICD-10-CM

## 2019-04-01 PROCEDURE — 94060 EVALUATION OF WHEEZING: CPT

## 2019-04-01 PROCEDURE — 94729 DIFFUSING CAPACITY: CPT

## 2019-04-01 PROCEDURE — 94726 PLETHYSMOGRAPHY LUNG VOLUMES: CPT

## 2019-09-20 ENCOUNTER — LAB REQUISITION (OUTPATIENT)
Dept: LAB | Facility: HOSPITAL | Age: 69
End: 2019-09-20
Payer: MEDICARE

## 2019-09-20 DIAGNOSIS — R73.01 IMPAIRED FASTING GLUCOSE: ICD-10-CM

## 2019-09-20 DIAGNOSIS — E55.9 VITAMIN D DEFICIENCY: ICD-10-CM

## 2019-09-20 DIAGNOSIS — E78.00 PURE HYPERCHOLESTEROLEMIA: ICD-10-CM

## 2019-09-20 LAB
25(OH)D3 SERPL-MCNC: 32.1 NG/ML (ref 30–100)
ALBUMIN SERPL-MCNC: 4 G/DL (ref 3.4–5)
ALBUMIN/GLOB SERPL: 1.2 {RATIO} (ref 1–2)
ALP LIVER SERPL-CCNC: 107 U/L (ref 55–142)
ALT SERPL-CCNC: 31 U/L (ref 13–56)
ANION GAP SERPL CALC-SCNC: 6 MMOL/L (ref 0–18)
AST SERPL-CCNC: 23 U/L (ref 15–37)
BASOPHILS # BLD AUTO: 0.07 X10(3) UL (ref 0–0.2)
BASOPHILS NFR BLD AUTO: 0.7 %
BILIRUB SERPL-MCNC: 0.9 MG/DL (ref 0.1–2)
BUN BLD-MCNC: 24 MG/DL (ref 7–18)
BUN/CREAT SERPL: 23.1 (ref 10–20)
CALCIUM BLD-MCNC: 9.5 MG/DL (ref 8.5–10.1)
CHLORIDE SERPL-SCNC: 105 MMOL/L (ref 98–112)
CHOLEST SMN-MCNC: 162 MG/DL (ref ?–200)
CO2 SERPL-SCNC: 29 MMOL/L (ref 21–32)
CREAT BLD-MCNC: 1.04 MG/DL (ref 0.55–1.02)
DEPRECATED RDW RBC AUTO: 47.1 FL (ref 35.1–46.3)
EOSINOPHIL # BLD AUTO: 0.5 X10(3) UL (ref 0–0.7)
EOSINOPHIL NFR BLD AUTO: 4.9 %
ERYTHROCYTE [DISTWIDTH] IN BLOOD BY AUTOMATED COUNT: 13.2 % (ref 11–15)
EST. AVERAGE GLUCOSE BLD GHB EST-MCNC: 131 MG/DL (ref 68–126)
GLOBULIN PLAS-MCNC: 3.4 G/DL (ref 2.8–4.4)
GLUCOSE BLD-MCNC: 132 MG/DL (ref 70–99)
HBA1C MFR BLD HPLC: 6.2 % (ref ?–5.7)
HCT VFR BLD AUTO: 37.5 % (ref 35–48)
HDLC SERPL-MCNC: 49 MG/DL (ref 40–59)
HGB BLD-MCNC: 12.4 G/DL (ref 12–16)
IMM GRANULOCYTES # BLD AUTO: 0.03 X10(3) UL (ref 0–1)
IMM GRANULOCYTES NFR BLD: 0.3 %
LDLC SERPL CALC-MCNC: 81 MG/DL (ref ?–100)
LYMPHOCYTES # BLD AUTO: 1.42 X10(3) UL (ref 1–4)
LYMPHOCYTES NFR BLD AUTO: 13.9 %
M PROTEIN MFR SERPL ELPH: 7.4 G/DL (ref 6.4–8.2)
MCH RBC QN AUTO: 32.1 PG (ref 26–34)
MCHC RBC AUTO-ENTMCNC: 33.1 G/DL (ref 31–37)
MCV RBC AUTO: 97.2 FL (ref 80–100)
MONOCYTES # BLD AUTO: 0.91 X10(3) UL (ref 0.1–1)
MONOCYTES NFR BLD AUTO: 8.9 %
NEUTROPHILS # BLD AUTO: 7.25 X10 (3) UL (ref 1.5–7.7)
NEUTROPHILS # BLD AUTO: 7.25 X10(3) UL (ref 1.5–7.7)
NEUTROPHILS NFR BLD AUTO: 71.3 %
NONHDLC SERPL-MCNC: 113 MG/DL (ref ?–130)
OSMOLALITY SERPL CALC.SUM OF ELEC: 296 MOSM/KG (ref 275–295)
PLATELET # BLD AUTO: 331 10(3)UL (ref 150–450)
POTASSIUM SERPL-SCNC: 3.9 MMOL/L (ref 3.5–5.1)
RBC # BLD AUTO: 3.86 X10(6)UL (ref 3.8–5.3)
SODIUM SERPL-SCNC: 140 MMOL/L (ref 136–145)
TRIGL SERPL-MCNC: 160 MG/DL (ref 30–149)
TSI SER-ACNC: 3.12 MIU/ML (ref 0.36–3.74)
VLDLC SERPL CALC-MCNC: 32 MG/DL (ref 0–30)
WBC # BLD AUTO: 10.2 X10(3) UL (ref 4–11)

## 2019-09-20 PROCEDURE — 83036 HEMOGLOBIN GLYCOSYLATED A1C: CPT | Performed by: INTERNAL MEDICINE

## 2019-09-20 PROCEDURE — 80053 COMPREHEN METABOLIC PANEL: CPT | Performed by: INTERNAL MEDICINE

## 2019-09-20 PROCEDURE — 84443 ASSAY THYROID STIM HORMONE: CPT | Performed by: INTERNAL MEDICINE

## 2019-09-20 PROCEDURE — 80061 LIPID PANEL: CPT | Performed by: INTERNAL MEDICINE

## 2019-09-20 PROCEDURE — 85025 COMPLETE CBC W/AUTO DIFF WBC: CPT | Performed by: INTERNAL MEDICINE

## 2019-09-20 PROCEDURE — 82306 VITAMIN D 25 HYDROXY: CPT | Performed by: INTERNAL MEDICINE

## 2019-09-25 ENCOUNTER — HOSPITAL ENCOUNTER (OUTPATIENT)
Dept: ULTRASOUND IMAGING | Facility: HOSPITAL | Age: 69
Discharge: HOME | End: 2019-09-25
Attending: INTERNAL MEDICINE
Payer: MEDICARE

## 2019-09-25 DIAGNOSIS — R10.12 LUQ ABDOMINAL PAIN: ICD-10-CM

## 2019-09-25 PROCEDURE — 76700 US EXAM ABDOM COMPLETE: CPT | Performed by: INTERNAL MEDICINE

## 2020-01-21 ENCOUNTER — LAB ENCOUNTER (OUTPATIENT)
Dept: LAB | Facility: HOSPITAL | Age: 70
End: 2020-01-21
Attending: INTERNAL MEDICINE
Payer: MEDICARE

## 2020-01-21 DIAGNOSIS — E78.5 HYPERLIPEMIA: ICD-10-CM

## 2020-01-21 DIAGNOSIS — I10 HTN (HYPERTENSION): Primary | ICD-10-CM

## 2020-01-21 LAB
ANION GAP SERPL CALC-SCNC: 5 MMOL/L (ref 0–18)
BUN BLD-MCNC: 18 MG/DL (ref 7–18)
BUN/CREAT SERPL: 17.3 (ref 10–20)
CALCIUM BLD-MCNC: 9.6 MG/DL (ref 8.5–10.1)
CHLORIDE SERPL-SCNC: 106 MMOL/L (ref 98–112)
CHOLEST SMN-MCNC: 163 MG/DL (ref ?–200)
CO2 SERPL-SCNC: 29 MMOL/L (ref 21–32)
CREAT BLD-MCNC: 1.04 MG/DL (ref 0.55–1.02)
GLUCOSE BLD-MCNC: 132 MG/DL (ref 70–99)
HDLC SERPL-MCNC: 59 MG/DL (ref 40–59)
LDLC SERPL CALC-MCNC: 80 MG/DL (ref ?–100)
NONHDLC SERPL-MCNC: 104 MG/DL (ref ?–130)
OSMOLALITY SERPL CALC.SUM OF ELEC: 294 MOSM/KG (ref 275–295)
PATIENT FASTING Y/N/NP: YES
PATIENT FASTING Y/N/NP: YES
POTASSIUM SERPL-SCNC: 3.8 MMOL/L (ref 3.5–5.1)
SODIUM SERPL-SCNC: 140 MMOL/L (ref 136–145)
TRIGL SERPL-MCNC: 118 MG/DL (ref 30–149)
VLDLC SERPL CALC-MCNC: 24 MG/DL (ref 0–30)

## 2020-01-21 PROCEDURE — 36415 COLL VENOUS BLD VENIPUNCTURE: CPT

## 2020-01-21 PROCEDURE — 80048 BASIC METABOLIC PNL TOTAL CA: CPT

## 2020-01-21 PROCEDURE — 80061 LIPID PANEL: CPT

## 2020-02-03 ENCOUNTER — LAB ENCOUNTER (OUTPATIENT)
Dept: LAB | Facility: HOSPITAL | Age: 70
End: 2020-02-03
Attending: INTERNAL MEDICINE
Payer: MEDICARE

## 2020-02-03 DIAGNOSIS — I10 HTN (HYPERTENSION): Primary | ICD-10-CM

## 2020-02-03 LAB
ANION GAP SERPL CALC-SCNC: 4 MMOL/L (ref 0–18)
BUN BLD-MCNC: 13 MG/DL (ref 7–18)
BUN/CREAT SERPL: 11.8 (ref 10–20)
CALCIUM BLD-MCNC: 9.3 MG/DL (ref 8.5–10.1)
CHLORIDE SERPL-SCNC: 106 MMOL/L (ref 98–112)
CO2 SERPL-SCNC: 30 MMOL/L (ref 21–32)
CREAT BLD-MCNC: 1.1 MG/DL (ref 0.55–1.02)
GLUCOSE BLD-MCNC: 135 MG/DL (ref 70–99)
NT-PROBNP SERPL-MCNC: 1934 PG/ML (ref ?–125)
OSMOLALITY SERPL CALC.SUM OF ELEC: 292 MOSM/KG (ref 275–295)
PATIENT FASTING Y/N/NP: YES
POTASSIUM SERPL-SCNC: 3.6 MMOL/L (ref 3.5–5.1)
SODIUM SERPL-SCNC: 140 MMOL/L (ref 136–145)

## 2020-02-03 PROCEDURE — 83880 ASSAY OF NATRIURETIC PEPTIDE: CPT

## 2020-02-03 PROCEDURE — 80048 BASIC METABOLIC PNL TOTAL CA: CPT

## 2020-02-03 PROCEDURE — 36415 COLL VENOUS BLD VENIPUNCTURE: CPT

## 2020-08-14 ENCOUNTER — LAB ENCOUNTER (OUTPATIENT)
Dept: LAB | Facility: REFERENCE LAB | Age: 70
End: 2020-08-14
Attending: INTERNAL MEDICINE
Payer: MEDICARE

## 2020-08-14 DIAGNOSIS — R73.01 IMPAIRED FASTING GLUCOSE: ICD-10-CM

## 2020-08-14 DIAGNOSIS — N18.30 CHRONIC KIDNEY DISEASE, STAGE III (MODERATE) (HCC): ICD-10-CM

## 2020-08-14 DIAGNOSIS — E78.00 PURE HYPERCHOLESTEROLEMIA: Primary | ICD-10-CM

## 2020-08-14 DIAGNOSIS — E55.9 AVITAMINOSIS D: ICD-10-CM

## 2020-08-14 LAB
25(OH)D3 SERPL-MCNC: 42.9 NG/ML (ref 30–100)
ALBUMIN SERPL-MCNC: 3.8 G/DL (ref 3.4–5)
ALBUMIN/GLOB SERPL: 1 {RATIO} (ref 1–2)
ALP LIVER SERPL-CCNC: 111 U/L (ref 55–142)
ALT SERPL-CCNC: 40 U/L (ref 13–56)
ANION GAP SERPL CALC-SCNC: 7 MMOL/L (ref 0–18)
AST SERPL-CCNC: 28 U/L (ref 15–37)
BASOPHILS # BLD AUTO: 0.04 X10(3) UL (ref 0–0.2)
BASOPHILS NFR BLD AUTO: 0.5 %
BILIRUB SERPL-MCNC: 1.5 MG/DL (ref 0.1–2)
BUN BLD-MCNC: 10 MG/DL (ref 7–18)
BUN/CREAT SERPL: 9.2 (ref 10–20)
CALCIUM BLD-MCNC: 9.3 MG/DL (ref 8.5–10.1)
CHLORIDE SERPL-SCNC: 104 MMOL/L (ref 98–112)
CHOLEST SMN-MCNC: 149 MG/DL (ref ?–200)
CO2 SERPL-SCNC: 29 MMOL/L (ref 21–32)
CREAT BLD-MCNC: 1.09 MG/DL (ref 0.55–1.02)
DEPRECATED RDW RBC AUTO: 45.9 FL (ref 35.1–46.3)
EOSINOPHIL # BLD AUTO: 0.22 X10(3) UL (ref 0–0.7)
EOSINOPHIL NFR BLD AUTO: 2.6 %
ERYTHROCYTE [DISTWIDTH] IN BLOOD BY AUTOMATED COUNT: 13.3 % (ref 11–15)
EST. AVERAGE GLUCOSE BLD GHB EST-MCNC: 131 MG/DL (ref 68–126)
GLOBULIN PLAS-MCNC: 3.7 G/DL (ref 2.8–4.4)
GLUCOSE BLD-MCNC: 123 MG/DL (ref 70–99)
HBA1C MFR BLD HPLC: 6.2 % (ref ?–5.7)
HCT VFR BLD AUTO: 38.5 % (ref 35–48)
HDLC SERPL-MCNC: 58 MG/DL (ref 40–59)
HGB BLD-MCNC: 13 G/DL (ref 12–16)
IMM GRANULOCYTES # BLD AUTO: 0.03 X10(3) UL (ref 0–1)
IMM GRANULOCYTES NFR BLD: 0.3 %
LDLC SERPL CALC-MCNC: 72 MG/DL (ref ?–100)
LYMPHOCYTES # BLD AUTO: 1.37 X10(3) UL (ref 1–4)
LYMPHOCYTES NFR BLD AUTO: 16 %
M PROTEIN MFR SERPL ELPH: 7.5 G/DL (ref 6.4–8.2)
MCH RBC QN AUTO: 32.2 PG (ref 26–34)
MCHC RBC AUTO-ENTMCNC: 33.8 G/DL (ref 31–37)
MCV RBC AUTO: 95.3 FL (ref 80–100)
MONOCYTES # BLD AUTO: 0.65 X10(3) UL (ref 0.1–1)
MONOCYTES NFR BLD AUTO: 7.6 %
NEUTROPHILS # BLD AUTO: 6.27 X10 (3) UL (ref 1.5–7.7)
NEUTROPHILS # BLD AUTO: 6.27 X10(3) UL (ref 1.5–7.7)
NEUTROPHILS NFR BLD AUTO: 73 %
NONHDLC SERPL-MCNC: 91 MG/DL (ref ?–130)
OSMOLALITY SERPL CALC.SUM OF ELEC: 290 MOSM/KG (ref 275–295)
PATIENT FASTING Y/N/NP: YES
PATIENT FASTING Y/N/NP: YES
PLATELET # BLD AUTO: 286 10(3)UL (ref 150–450)
POTASSIUM SERPL-SCNC: 3.5 MMOL/L (ref 3.5–5.1)
RBC # BLD AUTO: 4.04 X10(6)UL (ref 3.8–5.3)
SODIUM SERPL-SCNC: 140 MMOL/L (ref 136–145)
TRIGL SERPL-MCNC: 97 MG/DL (ref 30–149)
TSI SER-ACNC: 2.44 MIU/ML (ref 0.36–3.74)
VLDLC SERPL CALC-MCNC: 19 MG/DL (ref 0–30)
WBC # BLD AUTO: 8.6 X10(3) UL (ref 4–11)

## 2020-08-14 PROCEDURE — 36415 COLL VENOUS BLD VENIPUNCTURE: CPT

## 2020-08-14 PROCEDURE — 80053 COMPREHEN METABOLIC PANEL: CPT

## 2020-08-14 PROCEDURE — 84443 ASSAY THYROID STIM HORMONE: CPT

## 2020-08-14 PROCEDURE — 82306 VITAMIN D 25 HYDROXY: CPT

## 2020-08-14 PROCEDURE — 80061 LIPID PANEL: CPT

## 2020-08-14 PROCEDURE — 83036 HEMOGLOBIN GLYCOSYLATED A1C: CPT

## 2020-08-14 PROCEDURE — 85025 COMPLETE CBC W/AUTO DIFF WBC: CPT

## 2020-08-22 ENCOUNTER — HOSPITAL ENCOUNTER (OUTPATIENT)
Dept: CV DIAGNOSTICS | Facility: HOSPITAL | Age: 70
Discharge: HOME | End: 2020-08-22
Attending: INTERNAL MEDICINE
Payer: MEDICARE

## 2020-08-22 DIAGNOSIS — I35.1 AORTIC VALVE INSUFFICIENCY, ETIOLOGY OF CARDIAC VALVE DISEASE UNSPECIFIED: ICD-10-CM

## 2020-08-22 PROCEDURE — 93306 TTE W/DOPPLER COMPLETE: CPT | Performed by: INTERNAL MEDICINE

## 2020-08-23 ENCOUNTER — APPOINTMENT (OUTPATIENT)
Dept: GENERAL RADIOLOGY | Facility: HOSPITAL | Age: 70
End: 2020-08-23
Payer: MEDICARE

## 2020-08-23 ENCOUNTER — HOSPITAL ENCOUNTER (EMERGENCY)
Facility: HOSPITAL | Age: 70
Discharge: HOME | End: 2020-08-23
Attending: EMERGENCY MEDICINE
Payer: MEDICARE

## 2020-08-23 VITALS
OXYGEN SATURATION: 93 % | BODY MASS INDEX: 37.69 KG/M2 | TEMPERATURE: 99 F | HEIGHT: 66 IN | RESPIRATION RATE: 19 BRPM | WEIGHT: 234.56 LBS | SYSTOLIC BLOOD PRESSURE: 117 MMHG | DIASTOLIC BLOOD PRESSURE: 65 MMHG | HEART RATE: 66 BPM

## 2020-08-23 DIAGNOSIS — R07.9 ACUTE CHEST PAIN: Primary | ICD-10-CM

## 2020-08-23 LAB
ANION GAP SERPL CALC-SCNC: 5 MMOL/L (ref 0–18)
BASOPHILS # BLD AUTO: 0.03 X10(3) UL (ref 0–0.2)
BASOPHILS NFR BLD AUTO: 0.4 %
BUN BLD-MCNC: 19 MG/DL (ref 7–18)
BUN/CREAT SERPL: 20 (ref 10–20)
CALCIUM BLD-MCNC: 9.2 MG/DL (ref 8.5–10.1)
CHLORIDE SERPL-SCNC: 103 MMOL/L (ref 98–112)
CO2 SERPL-SCNC: 31 MMOL/L (ref 21–32)
CREAT BLD-MCNC: 0.95 MG/DL (ref 0.55–1.02)
DEPRECATED RDW RBC AUTO: 47.2 FL (ref 35.1–46.3)
EOSINOPHIL # BLD AUTO: 0.25 X10(3) UL (ref 0–0.7)
EOSINOPHIL NFR BLD AUTO: 3.3 %
ERYTHROCYTE [DISTWIDTH] IN BLOOD BY AUTOMATED COUNT: 13.4 % (ref 11–15)
GLUCOSE BLD-MCNC: 136 MG/DL (ref 70–99)
HCT VFR BLD AUTO: 36.5 % (ref 35–48)
HGB BLD-MCNC: 12.2 G/DL (ref 12–16)
IMM GRANULOCYTES # BLD AUTO: 0.02 X10(3) UL (ref 0–1)
IMM GRANULOCYTES NFR BLD: 0.3 %
LYMPHOCYTES # BLD AUTO: 1.41 X10(3) UL (ref 1–4)
LYMPHOCYTES NFR BLD AUTO: 18.8 %
MCH RBC QN AUTO: 31.9 PG (ref 26–34)
MCHC RBC AUTO-ENTMCNC: 33.4 G/DL (ref 31–37)
MCV RBC AUTO: 95.5 FL (ref 80–100)
MONOCYTES # BLD AUTO: 0.65 X10(3) UL (ref 0.1–1)
MONOCYTES NFR BLD AUTO: 8.7 %
NEUTROPHILS # BLD AUTO: 5.15 X10 (3) UL (ref 1.5–7.7)
NEUTROPHILS # BLD AUTO: 5.15 X10(3) UL (ref 1.5–7.7)
NEUTROPHILS NFR BLD AUTO: 68.5 %
OSMOLALITY SERPL CALC.SUM OF ELEC: 292 MOSM/KG (ref 275–295)
PLATELET # BLD AUTO: 260 10(3)UL (ref 150–450)
POTASSIUM SERPL-SCNC: 3.4 MMOL/L (ref 3.5–5.1)
RBC # BLD AUTO: 3.82 X10(6)UL (ref 3.8–5.3)
SODIUM SERPL-SCNC: 139 MMOL/L (ref 136–145)
TROPONIN I SERPL-MCNC: <0.045 NG/ML (ref ?–0.04)
WBC # BLD AUTO: 7.5 X10(3) UL (ref 4–11)

## 2020-08-23 PROCEDURE — 36415 COLL VENOUS BLD VENIPUNCTURE: CPT

## 2020-08-23 PROCEDURE — 71045 X-RAY EXAM CHEST 1 VIEW: CPT | Performed by: EMERGENCY MEDICINE

## 2020-08-23 PROCEDURE — 93005 ELECTROCARDIOGRAM TRACING: CPT

## 2020-08-23 PROCEDURE — 80048 BASIC METABOLIC PNL TOTAL CA: CPT | Performed by: EMERGENCY MEDICINE

## 2020-08-23 PROCEDURE — 84484 ASSAY OF TROPONIN QUANT: CPT | Performed by: EMERGENCY MEDICINE

## 2020-08-23 PROCEDURE — 99284 EMERGENCY DEPT VISIT MOD MDM: CPT

## 2020-08-23 PROCEDURE — 85025 COMPLETE CBC W/AUTO DIFF WBC: CPT | Performed by: EMERGENCY MEDICINE

## 2020-08-23 PROCEDURE — 93010 ELECTROCARDIOGRAM REPORT: CPT | Performed by: EMERGENCY MEDICINE

## 2020-08-23 NOTE — ED NOTES
The writer spoke with Trang Ferrer from [facility], she said that she will be here before 2 PM.    Pt;s Daughter made aware.

## 2020-08-23 NOTE — ED INITIAL ASSESSMENT (HPI)
Patient complain of chest pain, dizziness and shortness of breath. Patient has history of v tach. Patient on a heart monitor right now.

## 2020-08-23 NOTE — ED NOTES
Assumed care to this pt who came in per wheelchair from triage to room 37 for complaints of chest pain and SOB with headache, dizziness that has been going on x 72 hours per daughter.   Pt was here yesterday for 2D echo, on heart monitor, with pacemaker,his

## 2020-08-23 NOTE — ED PROVIDER NOTES
Patient Seen in: [facility] Emergency Department    History   Patient presents with:  Chest Pain Angina    Stated Complaint: chest pain and shortness of breath     HPI    Patient is here with her daughter.   She saw Dr. Octavia Herrera on Thursday she sta benzonatate 100 MG Oral Cap,  Take 100 mg by mouth 3 (three) times daily as needed for cough.    Losartan Potassium-HCTZ 100-12.5 MG Oral Tab,  TK 1 T PO QD   PARoxetine HCl 20 MG Oral Tab,  TK 1 T PO QD IN THE evening   multivitamin Oral Tab,  Take 1 table unusual behavior. Interacting well. We will do blood work EKG chest x-ray. I have paged her cardiologist Dr. Cris Dumas. We did discuss the patient. When the test results came back we did talk again. He came to evaluate the patient.   Plan is at this care provider within the next three months to obtain basic health screening including reassessment of your blood pressure.       Clinical Impression:  Acute chest pain  (primary encounter diagnosis)    Disposition:  Discharge    Follow-up:  Aidan Lopez

## 2020-08-23 NOTE — CONSULTS
[facility]    PATIENT'S NAME: Nic Wilhelm   ATTENDING PHYSICIAN: Brina Kraus.  Nba Dean MD   CONSULTING PHYSICIAN: Jc Farley DO   PATIENT ACCOUNT#:   [de-identified]    LOCATION:  [address]  MEDICAL RECORD #:   [id number]       DATE OF BIRTH:  [birth year]/ murmurs. ABDOMEN:  Soft, nontender. EXTREMITIES:  Without edema. NEUROLOGIC:  Alert, appropriate. SKIN:  Without rashes. LABORATORY DATA:  Troponin negative.     Echocardiogram demonstrating mild to moderate aortic insufficiency, ejection fraction 30

## 2020-08-24 ENCOUNTER — LAB ENCOUNTER (OUTPATIENT)
Dept: LAB | Facility: HOSPITAL | Age: 70
End: 2020-08-24
Attending: INTERNAL MEDICINE
Payer: MEDICARE

## 2020-08-24 DIAGNOSIS — I10 ESSENTIAL HYPERTENSION, MALIGNANT: Primary | ICD-10-CM

## 2020-08-24 LAB
ANION GAP SERPL CALC-SCNC: 7 MMOL/L (ref 0–18)
BUN BLD-MCNC: 19 MG/DL (ref 7–18)
BUN/CREAT SERPL: 17.8 (ref 10–20)
CALCIUM BLD-MCNC: 9.2 MG/DL (ref 8.5–10.1)
CHLORIDE SERPL-SCNC: 103 MMOL/L (ref 98–112)
CO2 SERPL-SCNC: 31 MMOL/L (ref 21–32)
CREAT BLD-MCNC: 1.07 MG/DL (ref 0.55–1.02)
DEPRECATED RDW RBC AUTO: 46.3 FL (ref 35.1–46.3)
ERYTHROCYTE [DISTWIDTH] IN BLOOD BY AUTOMATED COUNT: 13.4 % (ref 11–15)
GLUCOSE BLD-MCNC: 121 MG/DL (ref 70–99)
HCT VFR BLD AUTO: 36.2 % (ref 35–48)
HGB BLD-MCNC: 12 G/DL (ref 12–16)
INR BLD: 0.93 (ref 0.9–1.2)
MCH RBC QN AUTO: 31.3 PG (ref 26–34)
MCHC RBC AUTO-ENTMCNC: 33.1 G/DL (ref 31–37)
MCV RBC AUTO: 94.5 FL (ref 80–100)
OSMOLALITY SERPL CALC.SUM OF ELEC: 296 MOSM/KG (ref 275–295)
PATIENT FASTING Y/N/NP: NO
PLATELET # BLD AUTO: 287 10(3)UL (ref 150–450)
POTASSIUM SERPL-SCNC: 3.3 MMOL/L (ref 3.5–5.1)
PROTHROMBIN TIME: 12.3 SECONDS (ref 11.8–14.5)
RBC # BLD AUTO: 3.83 X10(6)UL (ref 3.8–5.3)
SODIUM SERPL-SCNC: 141 MMOL/L (ref 136–145)
WBC # BLD AUTO: 8 X10(3) UL (ref 4–11)

## 2020-08-24 PROCEDURE — 36415 COLL VENOUS BLD VENIPUNCTURE: CPT

## 2020-08-24 PROCEDURE — 85610 PROTHROMBIN TIME: CPT

## 2020-08-24 PROCEDURE — 85027 COMPLETE CBC AUTOMATED: CPT

## 2020-08-24 PROCEDURE — 80048 BASIC METABOLIC PNL TOTAL CA: CPT

## 2022-11-29 NOTE — PLAN OF CARE
CARDIAC ELECTROPHYSIOLOGY CONSULT NOTE          REQUESTING PROVIDER:  Ugo Cerda MD    PCP: Macy Suarez MD     CHIEF COMPLAINT:    Chief Complaint   Patient presents with   • New Patient     Per next gen last seen 4-. Laura states her heart is pounding history of SVT, ablationx2, states BP has been elevated she is dizzy and sob, recently had covid.       Chief Complaint     SVT    HISTORY OF PRESENT ILLNESS   Laura Miner is a 45 year old female seen today for SVT last seen 4-.  mixed connective tissue disease, rheumatoid arthritis, and COVID-19  The patient  Has had palpitations since getting COVID a few eeks ago. Seen in ED /urgent care for tachy to 140  There is no recent chest pain  Dyspnea on exertion is absent  The patient denies lightheadedness or syncope        CARDIAC HISTORY  OTHER:  1 Parox Atrial Tachycardia - 10/21/2009     RISK FACTORS:  1 Tobacco Use: No/never     CARDIOVASCULAR PROCEDURES  ECHO/MUGA:  Echo (Normal EF, Mild MR) - 2009   ELECTROPHYSIOLOGY:  EVR (First Degree AVB, atach,) - 5/2009   STRESS TESTS:  SEH (Normal EF, no ischemia) - 1/27/2016         MEDICATIONS   Inpatient medications:  No current facility-administered medications for this visit.       Outpatient medications:  No outpatient medications have been marked as taking for the 11/30/22 encounter (Office Visit) with Ugo Cerda MD.     HISTORIES     ALLERGIES:   Allergen Reactions   • Levofloxacin DIZZINESS     Passed out   • Levaquin HIVES   • Lidocaine GI UPSET     Passed out ; was given an oral prep for her esophagus   • Verapamil CARDIAC DISTURBANCES     Past Medical History:   Diagnosis Date   • H/O mixed connective tissue disease     presented by rheumathoid arthritis : now in remission   • Paroxysmal atrial tachycardia (CMS/HCC)    • SVT (supraventricular tachycardia) (CMS/HCC)      Past Surgical History:   Procedure Laterality Date   • Cardiac electrophysiology mapping and ablation     • Tubal  DISCHARGE PLANNING    • Discharge to home or other facility with appropriate resources Progressing        GENITOURINARY - ADULT    • Absence of urinary retention Progressing        NEUROLOGICAL - ADULT    • Achieves stable or improved neurological status P ligation       History reviewed. No pertinent family history.  Social History     Tobacco Use   • Smoking status: Never Smoker   • Smokeless tobacco: Never Used   Vaping Use   • Vaping Use: never used   Substance Use Topics   • Alcohol use: Never   • Drug use: Never       REVIEW OF SYSTEMS   Constitutional: Negative for fatigue, change in activity level or change in weight.   Skin: Negative for edema, pallor, rashes or open wounds.   HEENT: Negative for visual changes, epistaxis or headaches.  Respiratory: Negative for cough, orthopnea, paroxsymal nocturnal dyspnea, wheezing or shortness of breath with or without exertion.    Cardiovascular:see hPI   Gastrointestinal: Negative for abdominal pain.   Genitourinary: Negative for hematuria.  Extremities:  Negative for edema, petechiae or clubbing.  Neuro:  Negative for lightheadedness, dizziness or syncope.  Endocrine: Negative for weight loss or weight gain.  Hematological: Negative for bleeding or brusing.  Psych: Negative for change in affect, change in mentation or sleep disturbance.      PHYSICAL EXAM   Vital Signs:    Vitals:    11/30/22 1411 11/30/22 1412   BP: (!) 168/104 (!) 156/102   BP Location: LUE - Left upper extremity RUE - Right upper extremity   Patient Position: Sitting Sitting   Cuff Size: Regular Regular   Pulse: (!) 141    Weight: 63.5 kg (140 lb)    Height: 5' 5\" (1.651 m)      General:   Alert, cooperative, conversive in no acute distress.  Skin:  Warm and dry without rash.    Head:  Normocephalic-atraumatic.   Neck:  Trachea is midline. No adenopathy.  Normal thyroid without mass or tenderness.  Eyes:  Normal conjunctiva and sclera.  Cardiovascular: tachy S1, S2 normal, no murmur, click, rub or gallop  Respiratory: clear to auscultation bilaterally  Gastrointestinal:  Soft and nontender.  Normal bowel sounds.  No hepatomegaly or splenomegaly.   Extremities:  extremities normal, atraumatic, no cyanosis or edema  Neuro:   Orientated x 4.  No focal  deficits or lateralizing signs.  Psychiatric:   Cooperative.  Appropriate mood & affect.    LABORATORY DATA     Lab Results   Component Value Date    SODIUM 141 11/26/2022    CHLORIDE 103 11/26/2022    CO2 27 11/26/2022    POTASSIUM 3.6 11/26/2022    BUN 14 11/26/2022    CREATININE 0.90 11/26/2022    GLUCOSE 107 (H) 11/26/2022     Lab Results   Component Value Date    WBC 8.1 11/26/2022    HCT 41.6 11/26/2022    HGB 13.8 11/26/2022     11/26/2022     Lab Results   Component Value Date    INR 1.0 11/26/2022     Lab Results   Component Value Date    MG 1.7 11/16/2022     TSH (mcUnits/mL)   Date Value   11/26/2022 1.807       DIAGNOSTIC IMAGING        ASSESSMENT & PLAN   SVT- Hx atach. Not amenable to ablation.  Was on flecainide before and now back in apparent atach. Will restart flecainide and diltiazem. Will need stress echo since last stress was > 5 years ago  MCTD- in remission  RA  Recent COVID

## (undated) DEVICE — SYSTEM BN CMNT OPTVC 2 MX KT

## (undated) DEVICE — DRAPE SHEET LG

## (undated) DEVICE — STERILE LATEX POWDER-FREE SURGICAL GLOVESWITH NITRILE COATING: Brand: PROTEXIS

## (undated) DEVICE — 3M™ TEGADERM™ TRANSPARENT FILM DRESSING, 1626W, 4 IN X 4-3/4 IN (10 CM X 12 CM), 50 EACH/CARTON, 4 CARTON/CASE: Brand: 3M™ TEGADERM™

## (undated) DEVICE — SOL  .9 1000ML BTL

## (undated) DEVICE — 20 ML SYRINGE LUER-LOCK TIP: Brand: MONOJECT

## (undated) DEVICE — BATTERY

## (undated) DEVICE — SOLUTION SURG DURA PREP HAZMAT

## (undated) DEVICE — COTTON UNDERCAST PADDING,REGULAR FINISH: Brand: WEBRIL

## (undated) DEVICE — STERILE LATEX POWDER-FREE SURGICAL GLOVES WITH HYDROGEL COATING, SMOOTH FINISH, STRAIGHT FINGER: Brand: PROTEXIS

## (undated) DEVICE — SOL  .9 3000ML

## (undated) DEVICE — 2T8 #2 PDO 24 X 24: Brand: 2T8 #2 PDO 24 X 24

## (undated) DEVICE — BLADE SAW SAGITTAL 19.5

## (undated) DEVICE — ZIMMER® STERILE DISPOSABLE TOURNIQUET CUFF WITH PLC, DUAL PORT, SINGLE BLADDER, 34 IN. (86 CM)

## (undated) DEVICE — 3M™ COBAN™ NL STERILE NON-LATEX SELF-ADHERENT WRAP, 2084S, 4 IN X 5 YD (10 CM X 4,5 M), 18 ROLLS/CASE: Brand: 3M™ COBAN™

## (undated) DEVICE — Device: Brand: JELCO

## (undated) DEVICE — ADHV SKIN LIQUIBAND

## (undated) DEVICE — 2T9 -0- UNDYED MONODERM 36X36: Brand: 2T9 -0- UNDYED MONODERM 36X36

## (undated) DEVICE — PAD THRP 16IN WRPON MU LNG STM

## (undated) DEVICE — STERILE POLYISOPRENE POWDER-FREE SURGICAL GLOVES: Brand: PROTEXIS

## (undated) DEVICE — GAUZE SPONGES,12 PLY: Brand: CURITY

## (undated) DEVICE — TOTAL KNEE: Brand: MEDLINE INDUSTRIES, INC.

## (undated) DEVICE — KIT DRN 1/8IN PVC 3 SPRG EVAC

## (undated) DEVICE — NEEDLE HPO 18GA 1.5IN ECLPS

## (undated) DEVICE — DUAL CUT SAGITTAL BLADE

## (undated) DEVICE — 60 ML SYRINGE LUER-LOCK TIP: Brand: MONOJECT

## (undated) DEVICE — T5 HOOD WITH PEEL AWAY FACE SHIELD

## (undated) DEVICE — TRAY SRGPRP PVP IOD WT SCRB SM

## (undated) DEVICE — FAN SPRAY KIT: Brand: PULSAVAC®

## (undated) DEVICE — CONTAINER SPEC STR 4OZ GRY LID

## (undated) DEVICE — NON-ADHERENT PADS PREPACK: Brand: TELFA

## (undated) NOTE — MR AVS SNAPSHOT
Tulio Jimenes [address] [facility],[address]  [facility]  [phone]               Thank you for choosing us for your health care visit with Rosalba Gómez, ADRIEL.   We are glad to serve you and happy to provide you

## (undated) NOTE — Clinical Note
Dear Dr. Marquita Barkley    This letter is to inform you that Katya Wilde has been attending speech therapy for dysphagia with me. See below for my most recent plan of care.       Discharge Summary/Final Progress Note    Diagnosis:  Dysphagia  Authorized # o and control during meals. She feels it has become more natural and is becoming an unconscious habit of forming a bolus and controlling it and swallowing the material as a cohesive bolus.   No food or liquid presented today as patient still feels slightly s

## (undated) NOTE — LETTER
[address] [place] [address]  Authorization for Invasive Procedures  1.  I hereby authorize Dr. Michele Mena , my physician and whomever may be designated as the doctor's assistant, to perform the following operation and/or procedure: Roverto Briceño performed for the purposes of advancing medicine, science, and/or education, provided my identity is not revealed. If the procedure has been videotaped, the physician/surgeon will obtain the original videotape.  The hospital will not be responsible for stor My signature below affirms that prior to the time of the procedure, I have explained to the patient and/or her legal representative, the risks and benefits involved in the proposed treatment and any reasonable alternative to the proposed treatment.  I have

## (undated) NOTE — MR AVS SNAPSHOT
Tulio Jimenes [address] [facility],[address]  [facility]  [phone]  [phone]               Thank you for choosing us for your health care visit with Carlos Velarde, ADRIEL.   We are glad to serve you and happy to provide you office, you can view your past visit information in [employer] by going to Visits < Visit Summaries. [employer] questions? Call [phone] for help. [employer] is NOT to be used for urgent needs. For medical emergencies, dial 911.

## (undated) NOTE — MR AVS SNAPSHOT
Tulio Jimenes [address] [facility]  [facility]  [phone]  [phone]               Thank you for choosing us for your health care visit with ADRIEL Jacobs.   We are glad to serve you and happy to provide you

## (undated) NOTE — LETTER
[address], [place]  Authorization for Invasive Procedures  1.  I hereby authorize Dr. Lindy Pfeiffer , my physician and whomever may be designated as the doctor's assistant, to perform the following operation and/or procedure:  CARDIAC 5. I consent to the photographing of the operations or procedures to be performed for the purposes of advancing medicine, science, and/or education, provided my identity is not revealed.  If the procedure has been videotaped, the physician/surgeon will obta __________ Time: ___________    Statement of Physician  My signature below affirms that prior to the time of the procedure, I have explained to the patient and/or her legal representative, the risks and benefits involved in the proposed treatment and any r